# Patient Record
Sex: FEMALE | Race: WHITE | Employment: UNEMPLOYED | URBAN - METROPOLITAN AREA
[De-identification: names, ages, dates, MRNs, and addresses within clinical notes are randomized per-mention and may not be internally consistent; named-entity substitution may affect disease eponyms.]

---

## 2024-08-29 ENCOUNTER — HOSPITAL ENCOUNTER (INPATIENT)
Age: 79
LOS: 4 days | Discharge: NURSING, REHAB OR LONG-TERM CARE | DRG: 641 | End: 2024-09-02
Attending: STUDENT IN AN ORGANIZED HEALTH CARE EDUCATION/TRAINING PROGRAM | Admitting: INTERNAL MEDICINE
Payer: MEDICARE

## 2024-08-29 DIAGNOSIS — L89.90 PRESSURE ULCERS OF SKIN OF MULTIPLE TOPOGRAPHIC SITES: ICD-10-CM

## 2024-08-29 DIAGNOSIS — N39.0 UTI (URINARY TRACT INFECTION), BACTERIAL: ICD-10-CM

## 2024-08-29 DIAGNOSIS — E87.1 HYPONATREMIA: Primary | ICD-10-CM

## 2024-08-29 DIAGNOSIS — R62.7 FTT (FAILURE TO THRIVE) IN ADULT: ICD-10-CM

## 2024-08-29 DIAGNOSIS — A49.9 UTI (URINARY TRACT INFECTION), BACTERIAL: ICD-10-CM

## 2024-08-29 LAB
ALBUMIN SERPL-MCNC: 3.6 G/DL (ref 3.5–5.2)
ALP SERPL-CCNC: 87 U/L (ref 35–104)
ALT SERPL-CCNC: 15 U/L (ref 0–32)
ANION GAP SERPL CALCULATED.3IONS-SCNC: 11 MMOL/L (ref 7–16)
AST SERPL-CCNC: 34 U/L (ref 0–31)
BACTERIA URNS QL MICRO: ABNORMAL
BASOPHILS # BLD: 0.03 K/UL (ref 0–0.2)
BASOPHILS NFR BLD: 0 % (ref 0–2)
BILIRUB SERPL-MCNC: 0.5 MG/DL (ref 0–1.2)
BILIRUB UR QL STRIP: NEGATIVE
BUN SERPL-MCNC: 7 MG/DL (ref 6–23)
CALCIUM SERPL-MCNC: 8.6 MG/DL (ref 8.6–10.2)
CHLORIDE SERPL-SCNC: 82 MMOL/L (ref 98–107)
CLARITY UR: ABNORMAL
CO2 SERPL-SCNC: 30 MMOL/L (ref 22–29)
COLOR UR: YELLOW
CORTIS SERPL-MCNC: 21.7 UG/DL (ref 2.7–18.4)
CORTISOL COLLECTION INFO: ABNORMAL
CREAT SERPL-MCNC: 0.3 MG/DL (ref 0.5–1)
CREAT UR-MCNC: 31.1 MG/DL (ref 29–226)
CREAT UR-MCNC: 31.3 MG/DL (ref 29–226)
CRP SERPL HS-MCNC: 5 MG/L (ref 0–5)
EOSINOPHIL # BLD: 0.14 K/UL (ref 0.05–0.5)
EOSINOPHILS RELATIVE PERCENT: 2 % (ref 0–6)
EPI CELLS #/AREA URNS HPF: ABNORMAL /HPF
ERYTHROCYTE [DISTWIDTH] IN BLOOD BY AUTOMATED COUNT: 14.1 % (ref 11.5–15)
ERYTHROCYTE [SEDIMENTATION RATE] IN BLOOD BY WESTERGREN METHOD: 20 MM/HR (ref 0–20)
GFR, ESTIMATED: >90 ML/MIN/1.73M2
GLUCOSE SERPL-MCNC: 82 MG/DL (ref 74–99)
GLUCOSE UR STRIP-MCNC: NEGATIVE MG/DL
HCT VFR BLD AUTO: 40.1 % (ref 34–48)
HGB BLD-MCNC: 14 G/DL (ref 11.5–15.5)
HGB UR QL STRIP.AUTO: NEGATIVE
IMM GRANULOCYTES # BLD AUTO: 0.09 K/UL (ref 0–0.58)
IMM GRANULOCYTES NFR BLD: 1 % (ref 0–5)
KETONES UR STRIP-MCNC: NEGATIVE MG/DL
LACTATE BLDV-SCNC: 0.9 MMOL/L (ref 0.5–2.2)
LEUKOCYTE ESTERASE UR QL STRIP: ABNORMAL
LYMPHOCYTES NFR BLD: 0.5 K/UL (ref 1.5–4)
LYMPHOCYTES RELATIVE PERCENT: 6 % (ref 20–42)
MAGNESIUM SERPL-MCNC: 1.9 MG/DL (ref 1.6–2.6)
MCH RBC QN AUTO: 29.5 PG (ref 26–35)
MCHC RBC AUTO-ENTMCNC: 34.9 G/DL (ref 32–34.5)
MCV RBC AUTO: 84.4 FL (ref 80–99.9)
MONOCYTES NFR BLD: 0.6 K/UL (ref 0.1–0.95)
MONOCYTES NFR BLD: 7 % (ref 2–12)
NEUTROPHILS NFR BLD: 85 % (ref 43–80)
NEUTS SEG NFR BLD: 7.76 K/UL (ref 1.8–7.3)
NITRITE UR QL STRIP: POSITIVE
OSMOLALITY UR: 404 MOSM/KG (ref 300–900)
PH UR STRIP: 7.5 [PH] (ref 5–9)
PLATELET # BLD AUTO: 401 K/UL (ref 130–450)
PMV BLD AUTO: 8.8 FL (ref 7–12)
POTASSIUM SERPL-SCNC: 4 MMOL/L (ref 3.5–5)
PROT SERPL-MCNC: 7.2 G/DL (ref 6.4–8.3)
PROT UR STRIP-MCNC: NEGATIVE MG/DL
RBC # BLD AUTO: 4.75 M/UL (ref 3.5–5.5)
RBC #/AREA URNS HPF: ABNORMAL /HPF
SODIUM SERPL-SCNC: 123 MMOL/L (ref 132–146)
SODIUM UR-SCNC: 138 MMOL/L
SP GR UR STRIP: 1.01 (ref 1–1.03)
TOTAL PROTEIN, URINE: 12 MG/DL (ref 0–12)
URINE TOTAL PROTEIN CREATININE RATIO: 0.39 (ref 0–0.2)
UROBILINOGEN UR STRIP-ACNC: 2 EU/DL (ref 0–1)
UUN UR-MCNC: 224 MG/DL (ref 800–1666)
WBC #/AREA URNS HPF: ABNORMAL /HPF
WBC OTHER # BLD: 9.1 K/UL (ref 4.5–11.5)

## 2024-08-29 PROCEDURE — 85025 COMPLETE CBC W/AUTO DIFF WBC: CPT

## 2024-08-29 PROCEDURE — 85652 RBC SED RATE AUTOMATED: CPT

## 2024-08-29 PROCEDURE — 87077 CULTURE AEROBIC IDENTIFY: CPT

## 2024-08-29 PROCEDURE — 82570 ASSAY OF URINE CREATININE: CPT

## 2024-08-29 PROCEDURE — 99285 EMERGENCY DEPT VISIT HI MDM: CPT

## 2024-08-29 PROCEDURE — 6370000000 HC RX 637 (ALT 250 FOR IP)

## 2024-08-29 PROCEDURE — 87086 URINE CULTURE/COLONY COUNT: CPT

## 2024-08-29 PROCEDURE — 82533 TOTAL CORTISOL: CPT

## 2024-08-29 PROCEDURE — 83735 ASSAY OF MAGNESIUM: CPT

## 2024-08-29 PROCEDURE — 83605 ASSAY OF LACTIC ACID: CPT

## 2024-08-29 PROCEDURE — 84156 ASSAY OF PROTEIN URINE: CPT

## 2024-08-29 PROCEDURE — 2060000000 HC ICU INTERMEDIATE R&B

## 2024-08-29 PROCEDURE — 2580000003 HC RX 258

## 2024-08-29 PROCEDURE — 86140 C-REACTIVE PROTEIN: CPT

## 2024-08-29 PROCEDURE — 6360000002 HC RX W HCPCS

## 2024-08-29 PROCEDURE — 84300 ASSAY OF URINE SODIUM: CPT

## 2024-08-29 PROCEDURE — 84540 ASSAY OF URINE/UREA-N: CPT

## 2024-08-29 PROCEDURE — 6370000000 HC RX 637 (ALT 250 FOR IP): Performed by: SPECIALIST

## 2024-08-29 PROCEDURE — 83935 ASSAY OF URINE OSMOLALITY: CPT

## 2024-08-29 PROCEDURE — 81001 URINALYSIS AUTO W/SCOPE: CPT

## 2024-08-29 PROCEDURE — 6370000000 HC RX 637 (ALT 250 FOR IP): Performed by: STUDENT IN AN ORGANIZED HEALTH CARE EDUCATION/TRAINING PROGRAM

## 2024-08-29 PROCEDURE — 80053 COMPREHEN METABOLIC PANEL: CPT

## 2024-08-29 RX ORDER — QUETIAPINE FUMARATE 25 MG/1
50 TABLET, FILM COATED ORAL NIGHTLY
Status: DISCONTINUED | OUTPATIENT
Start: 2024-08-29 | End: 2024-09-02 | Stop reason: HOSPADM

## 2024-08-29 RX ORDER — DIPHENHYDRAMINE HCL 25 MG
25 TABLET ORAL EVERY 6 HOURS PRN
Status: DISCONTINUED | OUTPATIENT
Start: 2024-08-29 | End: 2024-09-02 | Stop reason: HOSPADM

## 2024-08-29 RX ORDER — MIRTAZAPINE 15 MG/1
15 TABLET, FILM COATED ORAL NIGHTLY
Status: DISCONTINUED | OUTPATIENT
Start: 2024-08-29 | End: 2024-09-02 | Stop reason: HOSPADM

## 2024-08-29 RX ORDER — QUETIAPINE FUMARATE 50 MG/1
50 TABLET, FILM COATED ORAL NIGHTLY
COMMUNITY

## 2024-08-29 RX ORDER — DOXYCYCLINE 100 MG/1
100 CAPSULE ORAL EVERY 12 HOURS SCHEDULED
Status: DISCONTINUED | OUTPATIENT
Start: 2024-08-29 | End: 2024-09-02

## 2024-08-29 RX ORDER — ENOXAPARIN SODIUM 100 MG/ML
40 INJECTION SUBCUTANEOUS DAILY
Status: DISCONTINUED | OUTPATIENT
Start: 2024-08-29 | End: 2024-09-02 | Stop reason: HOSPADM

## 2024-08-29 RX ORDER — DONEPEZIL HYDROCHLORIDE 5 MG/1
5 TABLET, FILM COATED ORAL NIGHTLY
COMMUNITY

## 2024-08-29 RX ORDER — TRIAMCINOLONE ACETONIDE 1 MG/G
OINTMENT TOPICAL 2 TIMES DAILY
Status: DISCONTINUED | OUTPATIENT
Start: 2024-08-29 | End: 2024-09-02 | Stop reason: HOSPADM

## 2024-08-29 RX ORDER — SODIUM CHLORIDE 9 MG/ML
INJECTION, SOLUTION INTRAVENOUS CONTINUOUS
Status: DISCONTINUED | OUTPATIENT
Start: 2024-08-29 | End: 2024-09-01

## 2024-08-29 RX ORDER — BACITRACIN ZINC 500 [USP'U]/G
OINTMENT TOPICAL ONCE
Status: COMPLETED | OUTPATIENT
Start: 2024-08-29 | End: 2024-08-29

## 2024-08-29 RX ORDER — PANTOPRAZOLE SODIUM 40 MG/1
40 TABLET, DELAYED RELEASE ORAL
Status: DISCONTINUED | OUTPATIENT
Start: 2024-08-30 | End: 2024-09-02 | Stop reason: HOSPADM

## 2024-08-29 RX ORDER — HYDROCHLOROTHIAZIDE 25 MG/1
25 TABLET ORAL DAILY
Status: ON HOLD | COMMUNITY
End: 2024-09-02 | Stop reason: HOSPADM

## 2024-08-29 RX ORDER — DONEPEZIL HYDROCHLORIDE 5 MG/1
5 TABLET, FILM COATED ORAL NIGHTLY
Status: DISCONTINUED | OUTPATIENT
Start: 2024-08-29 | End: 2024-09-02 | Stop reason: HOSPADM

## 2024-08-29 RX ADMIN — QUETIAPINE FUMARATE 50 MG: 25 TABLET ORAL at 22:05

## 2024-08-29 RX ADMIN — MICONAZOLE NITRATE: 2 OINTMENT TOPICAL at 22:05

## 2024-08-29 RX ADMIN — TRIAMCINOLONE ACETONIDE: 1 OINTMENT TOPICAL at 22:05

## 2024-08-29 RX ADMIN — DONEPEZIL HYDROCHLORIDE 5 MG: 5 TABLET, FILM COATED ORAL at 22:05

## 2024-08-29 RX ADMIN — SODIUM CHLORIDE: 9 INJECTION, SOLUTION INTRAVENOUS at 15:15

## 2024-08-29 RX ADMIN — MIRTAZAPINE 15 MG: 15 TABLET, FILM COATED ORAL at 22:05

## 2024-08-29 RX ADMIN — DOXYCYCLINE HYCLATE 100 MG: 100 CAPSULE ORAL at 22:05

## 2024-08-29 RX ADMIN — DIPHENHYDRAMINE HYDROCHLORIDE 25 MG: 25 TABLET ORAL at 18:19

## 2024-08-29 RX ADMIN — ENOXAPARIN SODIUM 40 MG: 100 INJECTION SUBCUTANEOUS at 15:11

## 2024-08-29 RX ADMIN — BACITRACIN ZINC: 500 OINTMENT TOPICAL at 11:58

## 2024-08-29 NOTE — CONSULTS
CONSULTATION NOTE :ID     Patient - Aracelis Murrieta,  Age - 79 y.o.    - 1945      Room Number - 0539/0539-02   MRN -  [id number]   [facility] # - [id number]  Date of Admission -  2024  7:13 AM  Patient's PCP: Michael Brock DO     Requesting Physician: Cesar Mcdowell DO    REASON FOR CONSULTATION   wounds  HISTORY OF PRESENT ILLNESS   This is a very pleasant 79 y.o. female who was admitted on 2024   to the hospital with a chief complaints of   Chief Complaint   Patient presents with    Wound Infection     Pt here for wound check and wound not getting better     ID was consulted on 24 rash wounds  Daughter present   Pt is in bed has no f/c/nv/d/pain  Pt has post back side rash POA    Daughter has been using atbx creams   Pt still smokes somewhat   Pt had [facility]   Pt deferred to daughter with questions   Pt is [place]   Pt lives with daughter  Usually sleeps in a recliner    Current antibiotics none         PAST MEDICAL AND SURGICAL HISTORY     No past medical history on file.    No past surgical history on file.    MEDICATIONS PRIOR TO ADMISSION:     Scheduled Meds:   donepezil  5 mg Oral Nightly    QUEtiapine  50 mg Oral Nightly    [START ON 2024] pantoprazole  40 mg Oral QAM AC    enoxaparin  40 mg SubCUTAneous Daily     Continuous Infusions:   sodium chloride 50 mL/hr at 24 1515     PRN Meds:diphenhydrAMINE    ALLERGIES:    Patient has no known allergies.  SOCIAL HISTORY:   TOBACCO:   reports that she has been smoking cigarettes. She does not have any smokeless tobacco history on file.     ETOH:   has no history on file for alcohol use.  Patient currently lives with family     FAMILY HISTORY:   No family history on file.    REVIEW OF SYSTEMS:     Constitutional: no fever, no night sweats, no fatigue.  Head: no head ache , no head injury, no migranes.  Eye: no blurring of vision, no double vision.  Ears: no hearing difficulty, no tinnitus  Mouth/throat: no  ulceration, dental caries , dysphagia  Lungs: no cough no chest pain  CVS: no palpitation, no chest pain, no shortness of breath  GI: no abdominal pain, no nausea , no vomiting, no constipation  JEANNIE: no dysuria, frequency and urgency, no hematuria, no kidney stones  Musculoskeletal: no joint pain, swelling , stiffness,  Endocrine: no polyuria, polydipsia, no cold or heat intolerance  Hematology: no anemia, no easy brusing or bleeding, no hx of clotting disorder  Dermatology: no skin rash, no eczema, no pruritis,  Psychiatry: no depression, no anxiety,no panic attacks, no suicide ideation    PHYSICAL EXAM:     BP (!) 170/75   Pulse 79   Temp 98.6 °F (37 °C) (Oral)   Resp 18   SpO2 95%   General:    Comfortable    HEENT:   AT/NC  ancietric moist oral mucosa.  Cardiovascular:   RRR ,S1S2,   Lungs:   CTAB  ant   Abdomen:    Soft, non tender to palpation.  :   ellis  Extremities:    No edema FROM  Skin:     Warm and dry. No rash               CNS:    Awake and alert  NAD  Lines:       Peripheral Intravenous Line:  Peripheral IV 08/29/24 Right Antecubital (Active)   Site Assessment Clean, dry & intact 08/29/24 1301   Line Status Capped;Normal saline locked 08/29/24 1301   Line Care Connections checked and tightened 08/29/24 1301   Phlebitis Assessment No symptoms 08/29/24 1301   Infiltration Assessment 0 08/29/24 1301   Alcohol Cap Used Yes 08/29/24 1301   Dressing Status Clean, dry & intact 08/29/24 1301   Dressing Type Transparent 08/29/24 1301          Drain(s):  External Urinary Catheter (Active)   Site Assessment Clean,dry & intact 08/29/24 1310   Placement Initiated 08/29/24 1310   Catheter Care Catheter/Wick replaced;Suction Canister/Tubing changed 08/29/24 1310   Perineal Care Yes 08/29/24 1310   Suction 40 mmgHg continuous 08/29/24 1310         LABS:     CBC:   Recent Labs     08/29/24  0808   WBC 9.1   HGB 14.0        BMP:    Recent Labs     08/29/24  0808   *   K 4.0   CL 82*   CO2 30*   BUN

## 2024-08-29 NOTE — FLOWSHEET NOTE
Inpatient Wound Care    Admit Date: 8/29/2024  7:13 AM    Reason for consult:  entire back spine area   Buttocks coccyx sacral area      Significant history:  No past medical history on file.    Wound history:      Findings:     08/29/24 1654   Wound 08/29/24 Back Medial   Date First Assessed: 08/29/24   Present on Original Admission: Yes  Primary Wound Type: Pressure Injury  Location: Back  Wound Location Orientation: Medial   Wound Image      Wound Etiology   (deep tissue areas over spine very bony)   Wound Cleansed Cleansed with saline   Wound Assessment Purple/maroon   Drainage Amount Small (< 25%)   Drainage Description Serosanguinous   Odor None   Wound 08/29/24 Coccyx Medial   Date First Assessed: 08/29/24   Present on Original Admission: Yes  Primary Wound Type: Pressure Injury  Location: (c) Coccyx  Wound Location Orientation: Medial   Wound Image    Wound Cleansed Cleansed with saline   Wound Assessment Purple/maroon   Drainage Amount Small (< 25%)   Odor None       **Informed Consent**    The patient has given verbal consent to have photos taken of back buttocks coccyx all skine and inserted into their chart as part of their permanent medical record for purposes of documentation, treatment management and/or medical review.   All Images taken on 8/29/24 of patient name: Aracelis Murrieta were transmitted and stored on secured Epic  Site located within Media Folder Tab by a registered Epic-Haiku Mobile Application Device.       Impression:  deep tissue injury to spine  Coccyx area    This is an extensive redness ? From incontinence  Pt is scratching at skin  Pt was pulling any dressing off    Interventions in place:  antibiotic ointment ordered    Plan:  Low air loss bed  Cont with bacitracin  Recommend a ellis   Recommend Dr May consult  derm consult              Purvi Alfonso RN 8/29/2024 4:57 PM

## 2024-08-29 NOTE — CARE COORDINATION
Case Management Assessment  Initial Evaluation    Date/Time of Evaluation: 8/29/2024 4:30 PM  Assessment Completed by: NICOLA Cam    If patient is discharged prior to next notation, then this note serves as note for discharge by case management.    Patient Name: Aracelis Murrieta                   YOB: 1945  Diagnosis: Hyponatremia [E87.1]  FTT (failure to thrive) in adult [R62.7]  Pressure ulcers of skin of multiple topographic sites [L89.90]                   Date / Time: 8/29/2024  7:13 AM    Patient Admission Status: Inpatient   Readmission Risk (Low < 19, Mod (19-27), High > 27): Readmission Risk Score: 11.3    Current PCP: Michael Brock, DO  PCP verified by CM? Yes    Chart Reviewed: Yes      History Provided by: Child/Family, Medical Record  Patient Orientation: Alert and Oriented, Other (see comment) (Has some confusion)    Patient Cognition: Alert    Hospitalization in the last 30 days (Readmission):  No    If yes, Readmission Assessment in CM Navigator will be completed.    Advance Directives:      Code Status: Not on file   Patient's Primary Decision Maker is: Named in Scanned ACP Document    Primary Decision Maker: Jennifer NolascoJuliKRYSTAL - Child - [phone]    Discharge Planning:    Patient lives with: Children Type of Home: House  Primary Care Giver: Family  Patient Support Systems include: Children   Current Financial resources:    Current community resources:    Current services prior to admission: Other (Comment) (WOUND CARE)            Current DME:              Type of Home Care services:  None    ADLS  Prior functional level: Mobility, Shopping, Housework, Cooking, Toileting  Current functional level: Mobility, Shopping, Housework, Cooking, Toileting    PT AM-PAC:   /24  OT AM-PAC:   /24    Family can provide assistance at DC: Other (comment) (Not in current state)  Would you like Case Management to discuss the discharge plan with any other family members/significant

## 2024-08-29 NOTE — ACP (ADVANCE CARE PLANNING)
Advance Care Planning   Healthcare Decision Maker:    Primary Decision Maker: Nick Nolasco - Child - [phone]    Click here to complete Healthcare Decision Makers including selection of the Healthcare Decision Maker Relationship (ie \"Primary\").  Today we documented Decision Maker(s) consistent with ACP documents on file.   Electronically signed by NICOLA Cam on 8/29/2024 at 11:47 AM

## 2024-08-29 NOTE — ED NOTES
Received pt report from Ravi CHEUNG and assuming care of pt now. Urine still needed from pt, Ravi previously set of external catheter on pt.

## 2024-08-29 NOTE — ED PROVIDER NOTES
[facility]  EMERGENCY DEPARTMENT ENCOUNTER        Pt Name: Aracelis Murrieta  MRN: [id number]  Birthdate 1945  Date of evaluation: 8/29/2024  Provider: Radha Vee DO  PCP: Michael Brock DO  Note Started: 9:22 AM EDT 8/29/24    CHIEF COMPLAINT       Chief Complaint   Patient presents with    Wound Infection     Pt here for wound check and wound not getting better       HISTORY OF PRESENT ILLNESS: 1 or more Elements     Limitations to history : None    Aracelis Murrieta is a 79 y.o. female who presents urgency department for evaluation of wound check.  Daughter is with her at bedside.  States the patient has significant pressure wounds on her back and sacral region; they have been present for several months, initially she was getting outpatient wound care and they seems to be getting better but over the past week or so they are starting to get worse.  She does not have home health care, her daughter cares for the wounds by herself.  She says the patient is pretty immobile does not move around very much and often just lays or sits, so her pressure ulcers continue to get worse.  No fevers or chills.  No other complaints.  No falls or injuries.  Daughter states they saw her PCP yesterday and they recommended that the patient be admitted.  Daughter is concerned because the patient is not moving around and her wounds continue to get worse due to immobility.  She feels that the patient might require inpatient rehab/physical therapy and close her wound care at her nursing facility.  Patient reports pain to her wounds, no other complaints.  No chest pain palpitations or shortness of breath.  No abdominal pain nausea vomiting diarrhea.    Daughter does state that the patient's urine has seemed to have a foul odor.  Patient denies any abnormal urinary symptoms.    Of note, daughter states up until a few months ago that the patient had been living by herself after her  passed away and recently moved  daughter discussed today’s results, in addition to providing specific details for the plan of care and counseling regarding the diagnosis and prognosis.  Questions are answered at this time and they are agreeable with the plan.    CONSULTS:   IP CONSULT TO SOCIAL WORK  IP CONSULT TO INFECTIOUS DISEASES       Please see ED course for any additional MDM documentation.       CRITICAL CARE TIME (.cct)     0 minutes            I am the Primary Clinician of Record.    FINAL IMPRESSION      1. Hyponatremia    2. Pressure ulcers of skin of multiple topographic sites    3. FTT (failure to thrive) in adult    4. UTI (urinary tract infection), bacterial          DISPOSITION/PLAN     DISPOSITION Admitted 08/29/2024 11:23:58 AM    Disposition: Admit to telemetry  Patient condition is stable      PATIENT REFERRED TO:  No follow-up provider specified.    DISCHARGE MEDICATIONS:  Current Discharge Medication List          DISCONTINUED MEDICATIONS:  Current Discharge Medication List                      Radha Vee D.O.     Emergency Medicine      8/29/2024 9:46 PM      NOTE: This report was transcribed using voice recognition software. Every effort was made to ensure accuracy; however, inadvertent computerized transcription errors may be present            Radha Vee DO  08/29/24 2146

## 2024-08-29 NOTE — PROGRESS NOTES
4 Eyes Skin Assessment     NAME:  Aracelis Murrieta  YOB: 1945  MEDICAL RECORD NUMBER:  [id number]    The patient is being assessed for  Admission    I agree that at least one RN has performed a thorough Head to Toe Skin Assessment on the patient. ALL assessment sites listed below have been assessed.      Areas assessed by both nurses:    Head, Face, Ears, Shoulders, Back, Chest, Arms, Elbows, Hands, Sacrum. Buttock, Coccyx, Ischium, Legs. Feet and Heels, and Under Medical Devices         Does the Patient have a Wound? Yes wound(s) were present on assessment. LDA wound assessment was Initiated and completed by RN    Mid spine purple, mid to lower back red and peeling, buttocks multiple areas of cracked skin  red and peeling skin, excoriation to right and left inner groin       Ruddy Prevention initiated by RN: Yes  Wound Care Orders initiated by RN: Yes    Pressure Injury (Stage 3,4, Unstageable, DTI, NWPT, and Complex wounds) if present, place Wound referral order by RN under : Yes    New Ostomies, if present place, Ostomy referral order under : No     Nurse 1 eSignature: Electronically signed by Ashly Varghese RN on 8/29/24 at 4:55 PM EDT    **SHARE this note so that the co-signing nurse can place an eSignature**    Nurse 2 eSignature: {Esignature:[id number]}

## 2024-08-30 LAB
25(OH)D3 SERPL-MCNC: 10.2 NG/ML (ref 30–100)
25(OH)D3 SERPL-MCNC: 9.9 NG/ML (ref 30–100)
ALBUMIN SERPL-MCNC: 3.2 G/DL (ref 3.5–5.2)
ALBUMIN SERPL-MCNC: 3.3 G/DL (ref 3.5–5.2)
ALP SERPL-CCNC: 71 U/L (ref 35–104)
ALP SERPL-CCNC: 72 U/L (ref 35–104)
ALT SERPL-CCNC: 10 U/L (ref 0–32)
ALT SERPL-CCNC: 9 U/L (ref 0–32)
ANION GAP SERPL CALCULATED.3IONS-SCNC: 13 MMOL/L (ref 7–16)
ANION GAP SERPL CALCULATED.3IONS-SCNC: 8 MMOL/L (ref 7–16)
ASO AB SERPL-ACNC: 31 IU/ML (ref 0–200)
ASO AB SERPL-ACNC: 40 IU/ML (ref 0–200)
AST SERPL-CCNC: 14 U/L (ref 0–31)
AST SERPL-CCNC: 19 U/L (ref 0–31)
BASOPHILS # BLD: 0 K/UL (ref 0–0.2)
BASOPHILS NFR BLD: 0 % (ref 0–2)
BILIRUB SERPL-MCNC: 0.4 MG/DL (ref 0–1.2)
BILIRUB SERPL-MCNC: 0.4 MG/DL (ref 0–1.2)
BUN SERPL-MCNC: 7 MG/DL (ref 6–23)
BUN SERPL-MCNC: 7 MG/DL (ref 6–23)
CALCIUM SERPL-MCNC: 8.2 MG/DL (ref 8.6–10.2)
CALCIUM SERPL-MCNC: 8.7 MG/DL (ref 8.6–10.2)
CHLORIDE SERPL-SCNC: 87 MMOL/L (ref 98–107)
CHLORIDE SERPL-SCNC: 88 MMOL/L (ref 98–107)
CHOLEST SERPL-MCNC: 155 MG/DL
CHOLEST SERPL-MCNC: 159 MG/DL
CO2 SERPL-SCNC: 28 MMOL/L (ref 22–29)
CO2 SERPL-SCNC: 29 MMOL/L (ref 22–29)
CREAT SERPL-MCNC: 0.3 MG/DL (ref 0.5–1)
CREAT SERPL-MCNC: 0.4 MG/DL (ref 0.5–1)
EOSINOPHIL # BLD: 0.08 K/UL (ref 0.05–0.5)
EOSINOPHILS RELATIVE PERCENT: 1 % (ref 0–6)
ERYTHROCYTE [DISTWIDTH] IN BLOOD BY AUTOMATED COUNT: 13.9 % (ref 11.5–15)
FOLATE SERPL-MCNC: 10.7 NG/ML (ref 4.8–24.2)
FOLATE SERPL-MCNC: 7.8 NG/ML (ref 4.8–24.2)
GFR, ESTIMATED: >90 ML/MIN/1.73M2
GFR, ESTIMATED: >90 ML/MIN/1.73M2
GLUCOSE SERPL-MCNC: 110 MG/DL (ref 74–99)
GLUCOSE SERPL-MCNC: 111 MG/DL (ref 74–99)
HCT VFR BLD AUTO: 36.6 % (ref 34–48)
HDLC SERPL-MCNC: 60 MG/DL
HDLC SERPL-MCNC: 63 MG/DL
HGB BLD-MCNC: 12.8 G/DL (ref 11.5–15.5)
IRON SATN MFR SERPL: 22 % (ref 15–50)
IRON SATN MFR SERPL: 22 % (ref 15–50)
IRON SERPL-MCNC: 54 UG/DL (ref 37–145)
IRON SERPL-MCNC: 55 UG/DL (ref 37–145)
LDLC SERPL CALC-MCNC: 81 MG/DL
LDLC SERPL CALC-MCNC: 89 MG/DL
LYMPHOCYTES NFR BLD: 0.99 K/UL (ref 1.5–4)
LYMPHOCYTES RELATIVE PERCENT: 11 % (ref 20–42)
MAGNESIUM SERPL-MCNC: 1.8 MG/DL (ref 1.6–2.6)
MAGNESIUM SERPL-MCNC: 1.9 MG/DL (ref 1.6–2.6)
MCH RBC QN AUTO: 29.7 PG (ref 26–35)
MCHC RBC AUTO-ENTMCNC: 35 G/DL (ref 32–34.5)
MCV RBC AUTO: 84.9 FL (ref 80–99.9)
MONOCYTES NFR BLD: 0.15 K/UL (ref 0.1–0.95)
MONOCYTES NFR BLD: 2 % (ref 2–12)
NEUTROPHILS NFR BLD: 86 % (ref 43–80)
NEUTS SEG NFR BLD: 7.58 K/UL (ref 1.8–7.3)
OSMOLALITY SERPL: 264 MOSM/KG (ref 285–310)
PLATELET # BLD AUTO: 390 K/UL (ref 130–450)
PMV BLD AUTO: 8.6 FL (ref 7–12)
POTASSIUM SERPL-SCNC: 3.1 MMOL/L (ref 3.5–5)
POTASSIUM SERPL-SCNC: 3.2 MMOL/L (ref 3.5–5)
PROCALCITONIN SERPL-MCNC: <0.02 NG/ML (ref 0–0.08)
PROT SERPL-MCNC: 5.5 G/DL (ref 6.4–8.3)
PROT SERPL-MCNC: 5.9 G/DL (ref 6.4–8.3)
RBC # BLD AUTO: 4.31 M/UL (ref 3.5–5.5)
RBC # BLD: ABNORMAL 10*6/UL
SODIUM SERPL-SCNC: 124 MMOL/L (ref 132–146)
SODIUM SERPL-SCNC: 129 MMOL/L (ref 132–146)
T4 FREE SERPL-MCNC: 1.1 NG/DL (ref 0.9–1.7)
T4 FREE SERPL-MCNC: 1.2 NG/DL (ref 0.9–1.7)
TIBC SERPL-MCNC: 244 UG/DL (ref 250–450)
TIBC SERPL-MCNC: 247 UG/DL (ref 250–450)
TRIGL SERPL-MCNC: 52 MG/DL
TRIGL SERPL-MCNC: 55 MG/DL
TROPONIN I SERPL HS-MCNC: 11 NG/L (ref 0–9)
TSH SERPL DL<=0.05 MIU/L-ACNC: 2.56 UIU/ML (ref 0.27–4.2)
VIT B12 SERPL-MCNC: 284 PG/ML (ref 211–946)
VIT B12 SERPL-MCNC: 296 PG/ML (ref 211–946)
VLDLC SERPL CALC-MCNC: 10 MG/DL
VLDLC SERPL CALC-MCNC: 11 MG/DL
WBC OTHER # BLD: 8.8 K/UL (ref 4.5–11.5)

## 2024-08-30 PROCEDURE — 6370000000 HC RX 637 (ALT 250 FOR IP): Performed by: SPECIALIST

## 2024-08-30 PROCEDURE — 86063 ANTISTREPTOLYSIN O SCREEN: CPT

## 2024-08-30 PROCEDURE — 2580000003 HC RX 258

## 2024-08-30 PROCEDURE — 6370000000 HC RX 637 (ALT 250 FOR IP)

## 2024-08-30 PROCEDURE — 82306 VITAMIN D 25 HYDROXY: CPT

## 2024-08-30 PROCEDURE — 83550 IRON BINDING TEST: CPT

## 2024-08-30 PROCEDURE — 2060000000 HC ICU INTERMEDIATE R&B

## 2024-08-30 PROCEDURE — 82746 ASSAY OF FOLIC ACID SERUM: CPT

## 2024-08-30 PROCEDURE — 84145 PROCALCITONIN (PCT): CPT

## 2024-08-30 PROCEDURE — 83540 ASSAY OF IRON: CPT

## 2024-08-30 PROCEDURE — 6360000002 HC RX W HCPCS

## 2024-08-30 PROCEDURE — 80053 COMPREHEN METABOLIC PANEL: CPT

## 2024-08-30 PROCEDURE — 84484 ASSAY OF TROPONIN QUANT: CPT

## 2024-08-30 PROCEDURE — 83735 ASSAY OF MAGNESIUM: CPT

## 2024-08-30 PROCEDURE — 84443 ASSAY THYROID STIM HORMONE: CPT

## 2024-08-30 PROCEDURE — 97530 THERAPEUTIC ACTIVITIES: CPT | Performed by: OCCUPATIONAL THERAPIST

## 2024-08-30 PROCEDURE — 83930 ASSAY OF BLOOD OSMOLALITY: CPT

## 2024-08-30 PROCEDURE — 84439 ASSAY OF FREE THYROXINE: CPT

## 2024-08-30 PROCEDURE — 93005 ELECTROCARDIOGRAM TRACING: CPT

## 2024-08-30 PROCEDURE — 97165 OT EVAL LOW COMPLEX 30 MIN: CPT | Performed by: OCCUPATIONAL THERAPIST

## 2024-08-30 PROCEDURE — 85025 COMPLETE CBC W/AUTO DIFF WBC: CPT

## 2024-08-30 PROCEDURE — 82607 VITAMIN B-12: CPT

## 2024-08-30 PROCEDURE — 80061 LIPID PANEL: CPT

## 2024-08-30 RX ORDER — POTASSIUM CHLORIDE 1500 MG/1
40 TABLET, EXTENDED RELEASE ORAL ONCE
Status: COMPLETED | OUTPATIENT
Start: 2024-08-30 | End: 2024-08-30

## 2024-08-30 RX ADMIN — MICONAZOLE NITRATE: 2 OINTMENT TOPICAL at 08:25

## 2024-08-30 RX ADMIN — PANTOPRAZOLE SODIUM 40 MG: 40 TABLET, DELAYED RELEASE ORAL at 08:24

## 2024-08-30 RX ADMIN — QUETIAPINE FUMARATE 50 MG: 25 TABLET ORAL at 21:11

## 2024-08-30 RX ADMIN — ENOXAPARIN SODIUM 40 MG: 100 INJECTION SUBCUTANEOUS at 08:25

## 2024-08-30 RX ADMIN — DOXYCYCLINE HYCLATE 100 MG: 100 CAPSULE ORAL at 08:24

## 2024-08-30 RX ADMIN — DOXYCYCLINE HYCLATE 100 MG: 100 CAPSULE ORAL at 21:12

## 2024-08-30 RX ADMIN — SODIUM CHLORIDE: 9 INJECTION, SOLUTION INTRAVENOUS at 10:47

## 2024-08-30 RX ADMIN — POTASSIUM CHLORIDE 40 MEQ: 1500 TABLET, EXTENDED RELEASE ORAL at 15:10

## 2024-08-30 RX ADMIN — DONEPEZIL HYDROCHLORIDE 5 MG: 5 TABLET, FILM COATED ORAL at 21:11

## 2024-08-30 RX ADMIN — TRIAMCINOLONE ACETONIDE: 1 OINTMENT TOPICAL at 08:25

## 2024-08-30 RX ADMIN — MICONAZOLE NITRATE: 2 OINTMENT TOPICAL at 21:12

## 2024-08-30 RX ADMIN — MIRTAZAPINE 15 MG: 15 TABLET, FILM COATED ORAL at 21:11

## 2024-08-30 RX ADMIN — TRIAMCINOLONE ACETONIDE: 1 OINTMENT TOPICAL at 21:12

## 2024-08-30 NOTE — PROGRESS NOTES
Internal Medicine Consult Note    [facility]    Cesar Mcdowell D.O., BESSIEI.                    Caleb Asher D.O., BHARAT Joya D.O.     Tiki Garcias, MSN, APRN, NP-C  Fortunato Rondon, MSN, APRN-CNP  Jairo Aaron, MSN, APRN-CNP  Lauren Pearson, MSN APRN-CNP  Niharika Arenas, MSN. APRN-NP-C     Primary Care Physician: Michael Brock DO   Admitting Physician:  Cesar Mcdowell DO  Admission date and time: 8/29/2024  7:13 AM    Room:  [address] [place] [address]  Admitting diagnosis: Hyponatremia [E87.1]  FTT (failure to thrive) in adult [R62.7]  Pressure ulcers of skin of multiple topographic sites [L89.90]    Patient Name: Aracelis Murrieta  MRN: [id number]    Date of Service: 8/30/2024     Subjective:  Aracelis is a 79 y.o. female who was seen and examined today,8/30/2024, at the bedside.  Patient is resting comfortably and appears more alert today.  There is no family present at the moment.  The patient states that she is trying to eat more and she is drinking her Ensure shakes.  Her sodium is up to 124 today.    Now family present during my examination.    Review of System:   Constitutional:   Denies fever or chills, weight loss or gain, improving fatigue  HEENT:   Denies ear pain, sore throat, sinus or eye problems.  Reports hard of hearing  Cardiovascular:   Denies any chest pain, irregular heartbeats, or palpitations.   Respiratory:   Denies shortness of breath, coughing, sputum production, hemoptysis, or wheezing.  Gastrointestinal:   Denies nausea, vomiting, diarrhea, or constipation.  Denies any abdominal pain.  Genitourinary:    Denies any urgency, frequency, hematuria. Voiding  without difficulty.  Extremities:   Denies lower extremity swelling, edema or cyanosis.   Neurology:    Denies any headache or focal neurological deficits, Denies generalized weakness or memory difficulty.   Psch:   Denies being anxious or depressed.  Musculoskeletal:    Denies   myalgias, joint complaints or back pain.  Generalized weakness  Integumentary:   Reports ulcerations on lower back and buttocks  Hematologic/Lymphatic:  Denies bruising or bleeding.    Physical Exam:  I/O this shift:  In: -   Out: 500 [Urine:500]    Intake/Output Summary (Last 24 hours) at 8/30/2024 1658  Last data filed at 8/30/2024 1145  Gross per 24 hour   Intake --   Output 950 ml   Net -950 ml   I/O last 3 completed shifts:  In: -   Out: 450 [Urine:450]  No data found.  Vital Signs:   Blood pressure (!) 149/65, pulse 91, temperature 99.3 °F (37.4 °C), temperature source Oral, resp. rate 18, SpO2 93%.    General appearance:  Alert, responsive, oriented to person, place, and time.  Appears malnourished no distress.  Head:  Normocephalic. No masses, lesions or tenderness.  Eyes:  PERRLA.  EOMI.  Sclera clear.  Buccal mucosa moist.  ENT:  Ears normal. Mucosa normal.  Neck:    Supple. Trachea midline. No thyromegaly. No JVD. No bruits.  Heart:    Rhythm regular. Rate controlled.  No murmurs.  Lungs:    Symmetrical. Clear to auscultation bilaterally.  No wheezes. No rhonchi. No rales.  Abdomen:   Soft. Non-tender. Non-distended. Bowel sounds positive. No organomegaly or masses.  No pain on palpation.  Extremities:    Peripheral pulses present.  No peripheral edema.  No ulcers. No cyanosis. No clubbing.  Neurologic:    Alert x 3.  No focal deficit.  Cranial nerves grossly intact. No focal weakness.  Psych:   Behavior is normal.  Somewhat depressed since 's suicide on November 14. Speech is not rapid and/or pressured.  Musculoskeletal:   Spine ROM normal. Muscular strength intact. Gait not assessed.  Generalized weakness  Integumentary:  See wound pictures  Genitalia/Breast:  Deferred    Medication:  Scheduled Meds:   donepezil  5 mg Oral Nightly    QUEtiapine  50 mg Oral Nightly    pantoprazole  40 mg Oral QAM AC    enoxaparin  40 mg SubCUTAneous Daily    miconazole nitrate   Topical BID    triamcinolone

## 2024-08-30 NOTE — DISCHARGE INSTR - COC
Drainage Amount Small (< 25%) 08/29/24 1654   Drainage Description Serosanguinous 08/29/24 1654   Odor None 08/29/24 1654   Number of days: 1       Wound 08/29/24 Coccyx Medial (Active)   Wound Image   08/29/24 1654   Wound Etiology Other 08/29/24 1310   Wound Cleansed Cleansed with saline 08/29/24 1654   Wound Assessment Purple/maroon 08/29/24 1654   Drainage Amount Small (< 25%) 08/29/24 1654   Odor None 08/29/24 1654   Number of days: 1        Elimination:  Continence:   Bowel: No  Bladder: No  Urinary Catheter: Insertion Date: 8/29/2024    Colostomy/Ileostomy/Ileal Conduit: No       Date of Last BM: 9/1/2024    Intake/Output Summary (Last 24 hours) at 8/30/2024 1429  Last data filed at 8/30/2024 1145  Gross per 24 hour   Intake --   Output 950 ml   Net -950 ml     I/O last 3 completed shifts:  In: -   Out: 450 [Urine:450]    Safety Concerns:     History of Falls (last 30 days)    Impairments/Disabilities:      None    Nutrition Therapy:  Current Nutrition Therapy:   - Oral Diet:  General    Routes of Feeding: Oral  Liquids: No Restrictions  Daily Fluid Restriction: yes - amount 1800  Last Modified Barium Swallow with Video (Video Swallowing Test): not done    Treatments at the Time of Hospital Discharge:       Rehab Therapies: PT/OT  Weight Bearing Status/Restrictions: No weight bearing restrictions    Patient's personal belongings (please select all that are sent with patient):  Cell Phone    RN SIGNATURE:  Electronically signed by Anibal Watts RN on 9/2/24 at 1:04 PM EDT    CASE MANAGEMENT/SOCIAL WORK SECTION    Inpatient Status Date: 8/29/2024    Readmission Risk Assessment Score:  Readmission Risk              Risk of Unplanned Readmission:  12           Discharging to Facility/ Agency   Name: Critical access hospital  Address:  Phone:  [phone] fax      Dialysis Facility (if applicable)   Name:  Address:  Dialysis Schedule:  Phone:  Fax:    / signature: Electronically  signed by Cheryl Kendrick RN on 8/30/24 at 2:30 PM EDT    PHYSICIAN SECTION    Prognosis: Good    Condition at Discharge: Stable    Rehab Potential (if transferring to Rehab): Fair    Recommended Labs or Other Treatments After Discharge: ***    Physician Certification: I certify the above information and transfer of Aracelis Murrieta  is necessary for the continuing treatment of the diagnosis listed and that she requires Skilled Nursing Facility for less 30 days.     Update Admission H&P: {CHP DME Changes in HandP:[id number]}    PHYSICIAN SIGNATURE:  {Esignature:[id number]}

## 2024-08-30 NOTE — PROGRESS NOTES
OCCUPATIONAL THERAPY INITIAL EVALUATION    [facility]  [address] [facility]         Date:2024                                                   Patient Name: Aracelis Murrieta     MRN: [id number]     : 1945     Room: [address] [place] [address]       Evaluating OT: Kate Carter, OTR/L; [id number]       Referring Provider and Orders/Date:    OT eval and treat  Start:  24,   End:  24,   ONE TIME,   Standing Count:  1 Occurrences,   R       July, Cesar CUELLAR DO    Recommended placement: subacute rehab       Diagnosis:   1. Hyponatremia    2. Pressure ulcers of skin of multiple topographic sites    3. FTT (failure to thrive) in adult    4. UTI (urinary tract infection), bacterial         Surgery: noen      Pertinent Medical History:      No past medical history on file.     No past surgical history on file.    Precautions:  Fall Risk, wound on the posterior spine and buttock, ellis        Assessment of current deficits     [x] Functional mobility  [x]ADLs  [x] Strength               []Cognition     [x] Functional transfers   [x] IADLs         [x] Safety Awareness   [x]Endurance     [] Fine Coordination              [x] Balance      [] Vision/perception   []Sensation      [x]Gross Motor Coordination  [] ROM  [] Delirium                   [] Motor Control     OT PLAN OF CARE   OT POC based on physician orders, patient diagnosis and results of clinical assessment    Frequency/Duration 1-3 days/wk for 2 weeks PRN   Specific OT Treatment Interventions to include:   * Instruction/training on adapted ADL techniques and AE recommendations to increase functional independence within precautions       * Training on energy conservation strategies, correct breathing pattern and techniques to improve independence/tolerance for self-care routine  * Functional transfer/mobility training/DME recommendations for increased independence, safety, and fall

## 2024-08-30 NOTE — H&P
[address] [address]                           HISTORY & PHYSICAL      PATIENT NAME: XAVIER MCKEON              : 1945  MED REC NO: [id number]                        ROOM: 0539  ACCOUNT NO: [id number]                       ADMIT DATE: 2024  PROVIDER: Cesar Mcdowell DO      CHIEF COMPLAINT AND HISTORY OF CHIEF COMPLAINT:  This is a pleasant 79-year-old white female who was admitted to [facility].  The patient was admitted to the hospital here through the emergency room on 2024.  The patient was admitted to the hospital here with increasing amount of discomfort.  Apparently, the patient had been somewhat sedentary at home due to inability and associated refusal to walk.  The patient presented to hospital with a large wound noted on the posterior spine area and also on the buttock area.  The patient has been getting outpatient wound care, but because of progressive decline, was seen and evaluated in the emergency room.  It was also noted at this time that her sodium was low at 123.  The patient does have evidence of poor nutrition along with polydipsia.  The patient has been admitted to the hospital to immediate care unit at this time.  The patient was seen and interviewed in the presence of her daughter.  Apparently, the patient was in relative good health up until 2023.  At that time, her  apparently shot himself and she discovered him.  Since that period of time, the patient has been showing a decline in condition, manifested by overall decline in her general health, refusal to ambulate and eat, along with refusing to seek psychological counseling.  Because of the decline noted, her daughter has presented her to live with her, who had been the primary caregiver.  The patient was seen and interviewed with her daughter.  From a cardiac standpoint of view, the patient currently

## 2024-08-30 NOTE — CARE COORDINATION
CM note: Met with patient and daughter in follow up to transition of care planning initiated by ER SANDEEP.  Pt has been accepted at [facility].  Pt needs a 3 day qualfiying inpatient stay under Medicare guidelines in order to discharge to SNF, this will not be completed until 9/1/24.  HENS completed in anticipation of weekend discharge.  JAMES will need signed by physician.

## 2024-08-30 NOTE — PROGRESS NOTES
Patient - Aracelis Murrieta,  Age - 79 y.o.    - 1945      Room Number - 0539/0539-02   MRN -  [id number]   [facility] # - [id number]  Date of Admission -  2024  7:13 AM    Problem list of patient:     Hospital Problems             Last Modified POA    * (Principal) Hyponatremia 2024 Yes     ASSESSMENT/PLAN       Cellulitis post back   Dermatitis post back   Uti   Suggest consulting wound care/ostomy nurse  Continue wound care  Encourage nutritional support  Continue to off load wound/pressure relief bed  Try triamcinolone/miconazole applied to back  doxycycline   Aso mrsa screen   Urine cx       SUBJECTIVE:   DOS:  24 in bed ate has no c/o f/c    OBJECTIVE   VITALS    axillary temperature is 98.5 °F (36.9 °C). Her blood pressure is 122/80 and her pulse is 84. Her respiration is 18 and oxygen saturation is 93%.       General Appearance:   awake and alert   HEENT:    at/nc  Neck:   supple, no mass  Lungs:    cta ant B   Cardiovascular:  s1/s2  Abdomen:   soft nt /nd  :  Neurologic:   nad no focal deficits   Skin :    no rash   Extremities:   has rom           Peripheral Intravenous Line:  Peripheral IV 24 Right Antecubital (Active)   Site Assessment Clean, dry & intact 24 08   Line Status Infusing 24 08   Line Care Connections checked and tightened 24   Phlebitis Assessment No symptoms 24 08   Infiltration Assessment 0 24   Alcohol Cap Used Yes 24 08   Dressing Status Clean, dry & intact 24   Dressing Type Transparent 24           Drain(s):  Urinary Catheter 24 Owen (Active)   Catheter Indications Assist in healing of open sacral or perineal wounds (Stage III, IV, or unstageable documented by a provider or enterostomal therapy) and/or full thickness perineal and lower extremity burns in incontinent patients 24 08   Site Assessment

## 2024-08-30 NOTE — PLAN OF CARE
Problem: Discharge Planning  Goal: Discharge to home or other facility with appropriate resources  Outcome: Progressing  Flowsheets (Taken 8/30/2024 0800)  Discharge to home or other facility with appropriate resources:   Identify barriers to discharge with patient and caregiver   Arrange for needed discharge resources and transportation as appropriate   Identify discharge learning needs (meds, wound care, etc)   Refer to discharge planning if patient needs post-hospital services based on physician order or complex needs related to functional status, cognitive ability or social support system     Problem: Skin/Tissue Integrity  Goal: Absence of new skin breakdown  Description: 1.  Monitor for areas of redness and/or skin breakdown  2.  Assess vascular access sites hourly  3.  Every 4-6 hours minimum:  Change oxygen saturation probe site  4.  Every 4-6 hours:  If on nasal continuous positive airway pressure, respiratory therapy assess nares and determine need for appliance change or resting period.  Outcome: Progressing     Problem: ABCDS Injury Assessment  Goal: Absence of physical injury  Outcome: Progressing  Flowsheets (Taken 8/30/2024 0800)  Absence of Physical Injury: Implement safety measures based on patient assessment     Problem: Safety - Adult  Goal: Free from fall injury  Outcome: Progressing  Flowsheets (Taken 8/30/2024 0800)  Free From Fall Injury:   Instruct family/caregiver on patient safety   Based on caregiver fall risk screen, instruct family/caregiver to ask for assistance with transferring infant if caregiver noted to have fall risk factors

## 2024-08-30 NOTE — PLAN OF CARE
Problem: Discharge Planning  Goal: Discharge to home or other facility with appropriate resources  Outcome: Progressing  Flowsheets  Taken 8/29/2024 1319 by Jose Gan RN  Discharge to home or other facility with appropriate resources:   Identify barriers to discharge with patient and caregiver   Identify discharge learning needs (meds, wound care, etc)   Refer to discharge planning if patient needs post-hospital services based on physician order or complex needs related to functional status, cognitive ability or social support system   Arrange for interpreters to assist at discharge as needed   Arrange for needed discharge resources and transportation as appropriate  Taken 8/29/2024 1301 by Jose Gan, RN  Discharge to home or other facility with appropriate resources:   Identify barriers to discharge with patient and caregiver   Arrange for needed discharge resources and transportation as appropriate   Identify discharge learning needs (meds, wound care, etc)   Refer to discharge planning if patient needs post-hospital services based on physician order or complex needs related to functional status, cognitive ability or social support system     Problem: Skin/Tissue Integrity  Goal: Absence of new skin breakdown  Description: 1.  Monitor for areas of redness and/or skin breakdown  2.  Assess vascular access sites hourly  3.  Every 4-6 hours minimum:  Change oxygen saturation probe site  4.  Every 4-6 hours:  If on nasal continuous positive airway pressure, respiratory therapy assess nares and determine need for appliance change or resting period.  Outcome: Progressing     Problem: ABCDS Injury Assessment  Goal: Absence of physical injury  Outcome: Progressing  Flowsheets (Taken 8/29/2024 1301 by Jose Gan, RN)  Absence of Physical Injury: Implement safety measures based on patient assessment     Problem: Safety - Adult  Goal: Free from fall injury  Outcome: Progressing

## 2024-08-31 ENCOUNTER — APPOINTMENT (OUTPATIENT)
Dept: CT IMAGING | Age: 79
DRG: 641 | End: 2024-08-31
Payer: MEDICARE

## 2024-08-31 LAB
ALBUMIN SERPL-MCNC: 3.2 G/DL (ref 3.5–5.2)
ALP SERPL-CCNC: 65 U/L (ref 35–104)
ALT SERPL-CCNC: 9 U/L (ref 0–32)
ANION GAP SERPL CALCULATED.3IONS-SCNC: 9 MMOL/L (ref 7–16)
AST SERPL-CCNC: 14 U/L (ref 0–31)
BASOPHILS # BLD: 0.03 K/UL (ref 0–0.2)
BASOPHILS NFR BLD: 1 % (ref 0–2)
BILIRUB SERPL-MCNC: 0.3 MG/DL (ref 0–1.2)
BUN SERPL-MCNC: 7 MG/DL (ref 6–23)
CALCIUM SERPL-MCNC: 8.3 MG/DL (ref 8.6–10.2)
CHLORIDE SERPL-SCNC: 93 MMOL/L (ref 98–107)
CO2 SERPL-SCNC: 28 MMOL/L (ref 22–29)
CREAT SERPL-MCNC: 0.4 MG/DL (ref 0.5–1)
EOSINOPHIL # BLD: 0.19 K/UL (ref 0.05–0.5)
EOSINOPHILS RELATIVE PERCENT: 3 % (ref 0–6)
ERYTHROCYTE [DISTWIDTH] IN BLOOD BY AUTOMATED COUNT: 14.4 % (ref 11.5–15)
GFR, ESTIMATED: >90 ML/MIN/1.73M2
GLUCOSE SERPL-MCNC: 107 MG/DL (ref 74–99)
HCT VFR BLD AUTO: 37.9 % (ref 34–48)
HGB BLD-MCNC: 13 G/DL (ref 11.5–15.5)
IMM GRANULOCYTES # BLD AUTO: 0.03 K/UL (ref 0–0.58)
IMM GRANULOCYTES NFR BLD: 1 % (ref 0–5)
LYMPHOCYTES NFR BLD: 0.9 K/UL (ref 1.5–4)
LYMPHOCYTES RELATIVE PERCENT: 14 % (ref 20–42)
MCH RBC QN AUTO: 30 PG (ref 26–35)
MCHC RBC AUTO-ENTMCNC: 34.3 G/DL (ref 32–34.5)
MCV RBC AUTO: 87.3 FL (ref 80–99.9)
MONOCYTES NFR BLD: 0.62 K/UL (ref 0.1–0.95)
MONOCYTES NFR BLD: 9 % (ref 2–12)
NEUTROPHILS NFR BLD: 73 % (ref 43–80)
NEUTS SEG NFR BLD: 4.82 K/UL (ref 1.8–7.3)
PLATELET # BLD AUTO: 369 K/UL (ref 130–450)
PMV BLD AUTO: 8.6 FL (ref 7–12)
POTASSIUM SERPL-SCNC: 3.6 MMOL/L (ref 3.5–5)
PROT SERPL-MCNC: 6 G/DL (ref 6.4–8.3)
RBC # BLD AUTO: 4.34 M/UL (ref 3.5–5.5)
SODIUM SERPL-SCNC: 130 MMOL/L (ref 132–146)
WBC OTHER # BLD: 6.6 K/UL (ref 4.5–11.5)

## 2024-08-31 PROCEDURE — 2580000003 HC RX 258

## 2024-08-31 PROCEDURE — 36415 COLL VENOUS BLD VENIPUNCTURE: CPT

## 2024-08-31 PROCEDURE — 6370000000 HC RX 637 (ALT 250 FOR IP)

## 2024-08-31 PROCEDURE — 85025 COMPLETE CBC W/AUTO DIFF WBC: CPT

## 2024-08-31 PROCEDURE — 6360000002 HC RX W HCPCS

## 2024-08-31 PROCEDURE — 71250 CT THORAX DX C-: CPT

## 2024-08-31 PROCEDURE — 2060000000 HC ICU INTERMEDIATE R&B

## 2024-08-31 PROCEDURE — 97161 PT EVAL LOW COMPLEX 20 MIN: CPT | Performed by: PHYSICAL THERAPIST

## 2024-08-31 PROCEDURE — 80053 COMPREHEN METABOLIC PANEL: CPT

## 2024-08-31 PROCEDURE — 6370000000 HC RX 637 (ALT 250 FOR IP): Performed by: SPECIALIST

## 2024-08-31 RX ORDER — ERGOCALCIFEROL 1.25 MG/1
50000 CAPSULE, LIQUID FILLED ORAL WEEKLY
Status: DISCONTINUED | OUTPATIENT
Start: 2024-08-31 | End: 2024-09-02 | Stop reason: HOSPADM

## 2024-08-31 RX ADMIN — QUETIAPINE FUMARATE 50 MG: 25 TABLET ORAL at 22:26

## 2024-08-31 RX ADMIN — DONEPEZIL HYDROCHLORIDE 5 MG: 5 TABLET, FILM COATED ORAL at 22:26

## 2024-08-31 RX ADMIN — MIRTAZAPINE 15 MG: 15 TABLET, FILM COATED ORAL at 22:26

## 2024-08-31 RX ADMIN — MICONAZOLE NITRATE: 2 OINTMENT TOPICAL at 11:25

## 2024-08-31 RX ADMIN — PANTOPRAZOLE SODIUM 40 MG: 40 TABLET, DELAYED RELEASE ORAL at 06:20

## 2024-08-31 RX ADMIN — TRIAMCINOLONE ACETONIDE: 1 OINTMENT TOPICAL at 11:24

## 2024-08-31 RX ADMIN — ENOXAPARIN SODIUM 40 MG: 100 INJECTION SUBCUTANEOUS at 11:24

## 2024-08-31 RX ADMIN — SODIUM CHLORIDE: 9 INJECTION, SOLUTION INTRAVENOUS at 06:21

## 2024-08-31 RX ADMIN — DOXYCYCLINE HYCLATE 100 MG: 100 CAPSULE ORAL at 22:26

## 2024-08-31 RX ADMIN — MICONAZOLE NITRATE: 2 OINTMENT TOPICAL at 22:26

## 2024-08-31 RX ADMIN — SODIUM CHLORIDE: 9 INJECTION, SOLUTION INTRAVENOUS at 22:38

## 2024-08-31 RX ADMIN — DOXYCYCLINE HYCLATE 100 MG: 100 CAPSULE ORAL at 11:23

## 2024-08-31 RX ADMIN — TRIAMCINOLONE ACETONIDE: 1 OINTMENT TOPICAL at 22:27

## 2024-08-31 RX ADMIN — ERGOCALCIFEROL 50000 UNITS: 1.25 CAPSULE ORAL at 14:05

## 2024-08-31 NOTE — PROGRESS NOTES
Patient - Aracelis Murrieta,  Age - 79 y.o.    - 1945      Room Number - 0539/0539-02   MRN -  [id number]   Located within [facility] # - [id number]  Date of Admission -  2024  7:13 AM    Problem list of patient:     Hospital Problems             Last Modified POA    * (Principal) Hyponatremia 2024 Yes     ASSESSMENT/PLAN       Cellulitis post back   Dermatitis post back   Uti  Urine cx gnr  Suggest consulting wound care/ostomy nurse  Continue wound care  Encourage nutritional support  Continue to off load wound/pressure relief bed  Try triamcinolone/miconazole applied to back  doxycycline   Aso mrsa screen         SUBJECTIVE:   DOS:  24 spitting up no f/c/n/v/d   in bed ate has no c/o f/c    OBJECTIVE   VITALS    infrared temperature is 97.2 °F (36.2 °C). Her blood pressure is 157/76 (abnormal) and her pulse is 103 (abnormal). Her respiration is 18 and oxygen saturation is 93%.       General Appearance:   awake and alert   HEENT:    at/nc  Neck:   supple, no mass  Lungs:    cta ant B   Cardiovascular:  s1/s2  Abdomen:   soft nt /nd  :  Neurologic:   nad no focal deficits   Skin :    no rash  post bacl less red skin drying  Extremities:   has rom           Peripheral Intravenous Line:  Peripheral IV 24 Right Antecubital (Active)   Site Assessment Clean, dry & intact 24 0800   Line Status Infusing 24 0800   Line Care Connections checked and tightened 24   Phlebitis Assessment No symptoms 24 08   Infiltration Assessment 0 24 08   Alcohol Cap Used Yes 24 0800   Dressing Status Clean, dry & intact 24 0800   Dressing Type Transparent 24 08           Drain(s):  Urinary Catheter 24 Owen (Active)   Catheter Indications Assist in healing of open sacral or perineal wounds (Stage III, IV, or unstageable documented by a provider or enterostomal therapy) and/or full thickness

## 2024-08-31 NOTE — PROGRESS NOTES
Internal Medicine Consult Note    [facility]    Cesar Mcdowell D.O., MANSOORORichardI.                    Caleb Asher D.O., MANSOORORichardI.                             Carrington Joya D.O.     Tiki Garcias, MSN, APRN, NP-C  Fortunato Rondon, MSN, APRN-CNP  Jairo Aaron, MSN, APRN-CNP  Lauren Pearson, MSN APRN-CNP  Niharika Arenas, MSN. APRN-NP-C     Primary Care Physician: Michael Brcok DO   Admitting Physician:  Cesar Mcdowell DO  Admission date and time: 8/29/2024  7:13 AM    Room:  [address] [place] [address]  Admitting diagnosis: Hyponatremia [E87.1]  FTT (failure to thrive) in adult [R62.7]  Pressure ulcers of skin of multiple topographic sites [L89.90]    Patient Name: Aracelis Murrieta  MRN: [id number]    Date of Service: 8/31/2024     Subjective:  Aracelis is a 79 y.o. female who was seen and examined today,8/31/2024, at the bedside.  Patient is resting comfortably and appears more alert today.  There is no family present at the moment.  She denies any pain or discomfort.  She states she is eating a little better.  Her sodium is up to 129 today.    No family present during my examination.    Review of System:   Constitutional:   Denies fever or chills, weight loss or gain, improving fatigue  HEENT:   Denies ear pain, sore throat, sinus or eye problems.  Reports hard of hearing  Cardiovascular:   Denies any chest pain, irregular heartbeats, or palpitations.   Respiratory:   Denies shortness of breath, coughing, sputum production, hemoptysis, or wheezing.  Gastrointestinal:   Denies nausea, vomiting, diarrhea, or constipation.  Denies any abdominal pain.  Genitourinary:    Denies any urgency, frequency, hematuria. Voiding  without difficulty.  Extremities:   Denies lower extremity swelling, edema or cyanosis.   Neurology:    Denies any headache or focal neurological deficits, Denies generalized weakness or memory difficulty.   Psch:   Denies being anxious or depressed.  Musculoskeletal:    Denies  myalgias,

## 2024-08-31 NOTE — PROGRESS NOTES
Internal Medicine Consult Note    [facility]    Cesar Mcdowell D.O., MANSOORORichardI.                    Caleb Asher D.O., MANSOORORichardI.                             Carrington Joya D.O.     Tiki Garcias, MSN, APRN, NP-C  Fortunato Rondon, MSN, APRN-CNP  Jairo Aaron, MSN, APRN-CNP  Lauren Pearson, MSN APRN-CNP  Niharika Arenas, MSN. APRN-NP-C     Primary Care Physician: Michael Brock DO   Admitting Physician:  Cesar Mcdowell DO  Admission date and time: 8/29/2024  7:13 AM    Room:  [address] [place] [address]  Admitting diagnosis: Hyponatremia [E87.1]  FTT (failure to thrive) in adult [R62.7]  Pressure ulcers of skin of multiple topographic sites [L89.90]    Patient Name: Aracelis Murrieta  MRN: [id number]    Date of Service: 8/31/2024     Subjective:  Aracelis is a 79 y.o. female who was seen and examined today,8/31/2024, at the bedside.  Patient is resting comfortably and appears more alert today.  There is no family present at the moment.  She denies any pain or discomfort.  She states she is eating a little better.  Will continue to monitor labs sodium up to 129 yesterday    No family present during my examination.    Review of System:   Constitutional:   Denies fever or chills, weight loss or gain, improving fatigue  HEENT:   Denies ear pain, sore throat, sinus or eye problems.  Reports hard of hearing  Cardiovascular:   Denies any chest pain, irregular heartbeats, or palpitations.   Respiratory:   Denies shortness of breath, coughing, sputum production, hemoptysis, or wheezing.  Gastrointestinal:   Denies nausea, vomiting, diarrhea, or constipation.  Denies any abdominal pain.  Genitourinary:    Denies any urgency, frequency, hematuria. Voiding  without difficulty.  Extremities:   Denies lower extremity swelling, edema or cyanosis.   Neurology:    Denies any headache or focal neurological deficits, Denies generalized weakness or memory difficulty.   Psch:   Denies being anxious or  depressed.  Musculoskeletal:    Denies  myalgias, joint complaints or back pain.  Generalized weakness  Integumentary:   Reports ulcerations on lower back and buttocks  Hematologic/Lymphatic:  Denies bruising or bleeding.    Physical Exam:  I/O this shift:  In: -   Out: 750 [Urine:750]    Intake/Output Summary (Last 24 hours) at 8/31/2024 1511  Last data filed at 8/31/2024 1409  Gross per 24 hour   Intake --   Output 2200 ml   Net -2200 ml   I/O last 3 completed shifts:  In: -   Out: 2400 [Urine:2400]  No data found.  Vital Signs:   Blood pressure (!) 172/80, pulse 86, temperature 97.1 °F (36.2 °C), temperature source Infrared, resp. rate 18, SpO2 94%.    General appearance:  Alert, responsive, oriented to person, place, and time.  Appears malnourished no distress.  Head:  Normocephalic. No masses, lesions or tenderness.  Eyes:  PERRLA.  EOMI.  Sclera clear.  Buccal mucosa moist.  ENT:  Ears normal. Mucosa normal.  Neck:    Supple. Trachea midline. No thyromegaly. No JVD. No bruits.  Heart:    Rhythm regular. Rate controlled.  No murmurs.  Lungs:    Symmetrical. Clear to auscultation bilaterally.  No wheezes. No rhonchi. No rales.  Abdomen:   Soft. Non-tender. Non-distended. Bowel sounds positive. No organomegaly or masses.  No pain on palpation.  Extremities:    Peripheral pulses present.  No peripheral edema.  No ulcers. No cyanosis. No clubbing.  Neurologic:    Alert x 3.  No focal deficit.  Cranial nerves grossly intact. No focal weakness.  Psych:   Behavior is normal.  Somewhat depressed since 's suicide on November 14. Speech is not rapid and/or pressured.  Musculoskeletal:   Spine ROM normal. Muscular strength intact. Gait not assessed.  Generalized weakness  Integumentary:  See wound pictures  Genitalia/Breast:  Deferred    Medication:  Scheduled Meds:   vitamin D  50,000 Units Oral Weekly    donepezil  5 mg Oral Nightly    QUEtiapine  50 mg Oral Nightly    pantoprazole  40 mg Oral QAM AC

## 2024-08-31 NOTE — PLAN OF CARE
Problem: Discharge Planning  Goal: Discharge to home or other facility with appropriate resources  8/30/2024 2307 by Brigid Dunham RN  Outcome: Progressing  8/30/2024 1831 by Jose Gan RN  Outcome: Progressing  Flowsheets (Taken 8/30/2024 0800)  Discharge to home or other facility with appropriate resources:   Identify barriers to discharge with patient and caregiver   Arrange for needed discharge resources and transportation as appropriate   Identify discharge learning needs (meds, wound care, etc)   Refer to discharge planning if patient needs post-hospital services based on physician order or complex needs related to functional status, cognitive ability or social support system     Problem: Skin/Tissue Integrity  Goal: Absence of new skin breakdown  Description: 1.  Monitor for areas of redness and/or skin breakdown  2.  Assess vascular access sites hourly  3.  Every 4-6 hours minimum:  Change oxygen saturation probe site  4.  Every 4-6 hours:  If on nasal continuous positive airway pressure, respiratory therapy assess nares and determine need for appliance change or resting period.  8/30/2024 2307 by Brigid Dunham RN  Outcome: Progressing  8/30/2024 1831 by Jose Gan RN  Outcome: Progressing     Problem: ABCDS Injury Assessment  Goal: Absence of physical injury  8/30/2024 2307 by Brigid Dunham RN  Outcome: Progressing  8/30/2024 1831 by Jose Gan RN  Outcome: Progressing  Flowsheets (Taken 8/30/2024 0800)  Absence of Physical Injury: Implement safety measures based on patient assessment     Problem: Safety - Adult  Goal: Free from fall injury  8/30/2024 2307 by Brigid Dunham RN  Outcome: Progressing  8/30/2024 1831 by Jose Gan RN  Outcome: Progressing  Flowsheets (Taken 8/30/2024 0800)  Free From Fall Injury:   Instruct family/caregiver on patient safety   Based on caregiver fall risk screen, instruct family/caregiver to ask for assistance with  transferring infant if caregiver noted to have fall risk factors

## 2024-08-31 NOTE — PROGRESS NOTES
Internal Medicine Consult Note    [facility]    Cesar Mcdowell D.O., MANSOORORichardI.                    Caleb Asher D.O., MANSOORORichardI.                             Carrington Joya D.O.     Tiki Garcias, MSN, APRN, NP-C  Fortunato Rondon, MSN, APRN-CNP  Jairo Aaron, MSN, APRN-CNP  Lauren Pearson, MSN APRN-CNP  Niharika Arenas, MSN. APRN-NP-C     Primary Care Physician: Michael Brock DO   Admitting Physician:  Cesar Mcdowell DO  Admission date and time: 8/29/2024  7:13 AM    Room:  [address] [place] [place] [address]  Admitting diagnosis: Hyponatremia [E87.1]  FTT (failure to thrive) in adult [R62.7]  Pressure ulcers of skin of multiple topographic sites [L89.90]    Patient Name: Aracelis Murrieta  MRN: [id number]    Date of Service: 8/31/2024     Subjective:  Aracelis is a 79 y.o. female who was seen and examined today,8/31/2024, at the bedside.  Patient is resting comfortably and appears more alert today.  There is no family present at the moment.  She denies any pain or discomfort.  She states she is eating a little better.  Will continue to monitor labs sodium up to 129 yesterday    No family present during my examination.    Review of System:   Constitutional:   Denies fever or chills, weight loss or gain, improving fatigue  HEENT:   Denies ear pain, sore throat, sinus or eye problems.  Reports hard of hearing  Cardiovascular:   Denies any chest pain, irregular heartbeats, or palpitations.   Respiratory:   Denies shortness of breath, coughing, sputum production, hemoptysis, or wheezing.  Gastrointestinal:   Denies nausea, vomiting, diarrhea, or constipation.  Denies any abdominal pain.  Genitourinary:    Denies any urgency, frequency, hematuria. Voiding  without difficulty.  Extremities:   Denies lower extremity swelling, edema or cyanosis.   Neurology:    Denies any headache or focal neurological deficits, Denies generalized weakness or memory difficulty.   Psch:   Denies being anxious or

## 2024-09-01 LAB
ALBUMIN SERPL-MCNC: 3.2 G/DL (ref 3.5–5.2)
ALP SERPL-CCNC: 64 U/L (ref 35–104)
ALT SERPL-CCNC: 10 U/L (ref 0–32)
ANION GAP SERPL CALCULATED.3IONS-SCNC: 7 MMOL/L (ref 7–16)
AST SERPL-CCNC: 14 U/L (ref 0–31)
BASOPHILS # BLD: 0.03 K/UL (ref 0–0.2)
BASOPHILS NFR BLD: 1 % (ref 0–2)
BILIRUB SERPL-MCNC: 0.3 MG/DL (ref 0–1.2)
BUN SERPL-MCNC: 10 MG/DL (ref 6–23)
CALCIUM SERPL-MCNC: 8.2 MG/DL (ref 8.6–10.2)
CHLORIDE SERPL-SCNC: 94 MMOL/L (ref 98–107)
CO2 SERPL-SCNC: 29 MMOL/L (ref 22–29)
CREAT SERPL-MCNC: 0.4 MG/DL (ref 0.5–1)
EKG ATRIAL RATE: 88 BPM
EKG P AXIS: 69 DEGREES
EKG P-R INTERVAL: 216 MS
EKG Q-T INTERVAL: 380 MS
EKG QRS DURATION: 90 MS
EKG QTC CALCULATION (BAZETT): 459 MS
EKG R AXIS: 28 DEGREES
EKG T AXIS: 64 DEGREES
EKG VENTRICULAR RATE: 88 BPM
EOSINOPHIL # BLD: 0.18 K/UL (ref 0.05–0.5)
EOSINOPHILS RELATIVE PERCENT: 3 % (ref 0–6)
ERYTHROCYTE [DISTWIDTH] IN BLOOD BY AUTOMATED COUNT: 14.5 % (ref 11.5–15)
GFR, ESTIMATED: >90 ML/MIN/1.73M2
GLUCOSE SERPL-MCNC: 97 MG/DL (ref 74–99)
HCT VFR BLD AUTO: 36.4 % (ref 34–48)
HGB BLD-MCNC: 12.3 G/DL (ref 11.5–15.5)
IMM GRANULOCYTES # BLD AUTO: <0.03 K/UL (ref 0–0.58)
IMM GRANULOCYTES NFR BLD: 0 % (ref 0–5)
LYMPHOCYTES NFR BLD: 0.68 K/UL (ref 1.5–4)
LYMPHOCYTES RELATIVE PERCENT: 12 % (ref 20–42)
MCH RBC QN AUTO: 29.2 PG (ref 26–35)
MCHC RBC AUTO-ENTMCNC: 33.8 G/DL (ref 32–34.5)
MCV RBC AUTO: 86.5 FL (ref 80–99.9)
MONOCYTES NFR BLD: 0.54 K/UL (ref 0.1–0.95)
MONOCYTES NFR BLD: 9 % (ref 2–12)
NEUTROPHILS NFR BLD: 75 % (ref 43–80)
NEUTS SEG NFR BLD: 4.36 K/UL (ref 1.8–7.3)
PLATELET # BLD AUTO: 343 K/UL (ref 130–450)
PMV BLD AUTO: 8.4 FL (ref 7–12)
POTASSIUM SERPL-SCNC: 3.9 MMOL/L (ref 3.5–5)
PROT SERPL-MCNC: 5.5 G/DL (ref 6.4–8.3)
RBC # BLD AUTO: 4.21 M/UL (ref 3.5–5.5)
SODIUM SERPL-SCNC: 130 MMOL/L (ref 132–146)
WBC OTHER # BLD: 5.8 K/UL (ref 4.5–11.5)

## 2024-09-01 PROCEDURE — 85025 COMPLETE CBC W/AUTO DIFF WBC: CPT

## 2024-09-01 PROCEDURE — 6370000000 HC RX 637 (ALT 250 FOR IP)

## 2024-09-01 PROCEDURE — 2060000000 HC ICU INTERMEDIATE R&B

## 2024-09-01 PROCEDURE — 6370000000 HC RX 637 (ALT 250 FOR IP): Performed by: SPECIALIST

## 2024-09-01 PROCEDURE — 80053 COMPREHEN METABOLIC PANEL: CPT

## 2024-09-01 PROCEDURE — 36415 COLL VENOUS BLD VENIPUNCTURE: CPT

## 2024-09-01 PROCEDURE — 87081 CULTURE SCREEN ONLY: CPT

## 2024-09-01 PROCEDURE — 6360000002 HC RX W HCPCS

## 2024-09-01 RX ORDER — LOSARTAN POTASSIUM 50 MG/1
25 TABLET ORAL DAILY
Status: DISCONTINUED | OUTPATIENT
Start: 2024-09-01 | End: 2024-09-02 | Stop reason: HOSPADM

## 2024-09-01 RX ADMIN — DOXYCYCLINE HYCLATE 100 MG: 100 CAPSULE ORAL at 21:05

## 2024-09-01 RX ADMIN — MICONAZOLE NITRATE: 2 OINTMENT TOPICAL at 21:06

## 2024-09-01 RX ADMIN — ENOXAPARIN SODIUM 40 MG: 100 INJECTION SUBCUTANEOUS at 08:45

## 2024-09-01 RX ADMIN — MICONAZOLE NITRATE: 2 OINTMENT TOPICAL at 09:00

## 2024-09-01 RX ADMIN — TRIAMCINOLONE ACETONIDE: 1 OINTMENT TOPICAL at 08:46

## 2024-09-01 RX ADMIN — TRIAMCINOLONE ACETONIDE: 1 OINTMENT TOPICAL at 21:06

## 2024-09-01 RX ADMIN — LOSARTAN POTASSIUM 25 MG: 50 TABLET, FILM COATED ORAL at 08:45

## 2024-09-01 RX ADMIN — DONEPEZIL HYDROCHLORIDE 5 MG: 5 TABLET, FILM COATED ORAL at 21:05

## 2024-09-01 RX ADMIN — DOXYCYCLINE HYCLATE 100 MG: 100 CAPSULE ORAL at 08:45

## 2024-09-01 RX ADMIN — MIRTAZAPINE 15 MG: 15 TABLET, FILM COATED ORAL at 21:05

## 2024-09-01 RX ADMIN — QUETIAPINE FUMARATE 50 MG: 25 TABLET ORAL at 21:05

## 2024-09-01 NOTE — PROGRESS NOTES
Internal Medicine Consult Note    [facility]    Cesar Mcdowell D.O., MANSOORORichardI.                    Caleb Asher D.O., MANSOORORichardI.                             Carrington Joya D.O.     Tiki Garcias, MSN, APRN, NP-C  Fortunato Rondon, MSN, APRN-CNP  Jairo Aaron, MSN, APRN-CNP  Lauren Pearson, MSN APRN-CNP  Niharika Arenas, MSN. APRN-NP-C     Primary Care Physician: Michael Brock DO   Admitting Physician:  Cesar Mcdowell DO  Admission date and time: 8/29/2024  7:13 AM    Room:  [address] [place] [address]  Admitting diagnosis: Hyponatremia [E87.1]  FTT (failure to thrive) in adult [R62.7]  Pressure ulcers of skin of multiple topographic sites [L89.90]    Patient Name: Aracelis Murrieta  MRN: [id number]    Date of Service: 9/1/2024     Subjective:  Aracelis is a 79 y.o. female who was seen and examined today,9/1/2024, at the bedside.  Patient is sitting up in bed eating lunch.  Her daughter is currently at the bedside.  Patient denies any pain or discomfort.  Her sodium is up to 130 today we will discontinue IV fluids and continue to monitor sodium levels.  No family present during my examination.    Review of System:   Constitutional:   Denies fever or chills, weight loss or gain, improving fatigue  HEENT:   Denies ear pain, sore throat, sinus or eye problems.  Reports hard of hearing  Cardiovascular:   Denies any chest pain, irregular heartbeats, or palpitations.   Respiratory:   Denies shortness of breath, coughing, sputum production, hemoptysis, or wheezing.  Gastrointestinal:   Denies nausea, vomiting, diarrhea, or constipation.  Denies any abdominal pain.  Genitourinary:    Denies any urgency, frequency, hematuria. Voiding  without difficulty.  Extremities:   Denies lower extremity swelling, edema or cyanosis.   Neurology:    Denies any headache or focal neurological deficits, Denies generalized weakness or memory difficulty.   Psch:   Denies being anxious or depressed.  Musculoskeletal:    Denies   supplements  Start Remeron  Wound care to eval and treat  PT and OT  Check CT scan of the chest  Infectious disease following  Monitor labs and vitals  Stop IV fluids continue to monitor sodium levels    Continue current therapy.  See orders for further plan of care.    More than 50% of my  time was spent at the bedside counseling/coordinating care with the patient and/or family with face to face contact.  This time was spent reviewing notes and laboratory data as well as instructing and counseling the patient. Time I spent with the family or surrogate(s) is included only if the patient was incapable of providing the necessary information or participating in medical decisions. I also discussed the differential diagnosis and all of the proposed management plans with the patient and individuals accompanying the patient.    The patient was seen, examined and then discussed with Dr. Mcdowell.     MARK Medellin CNP  9/1/2024  4:16 PM        I saw and evaluated the patient. I agree with the findings and the plan of care as documented in Jairo FLORES-CNP note.    Cesar Mcdowell DO, FACOI  4:58 PM  9/1/2024

## 2024-09-01 NOTE — PROGRESS NOTES
Patient - Aracelis Murrieta,  Age - 79 y.o.    - 1945      Room Number - 0539/0539-01   MRN -  [id number]   [facility] # - [id number]  Date of Admission -  2024  7:13 AM    Problem list of patient:     Hospital Problems             Last Modified POA    * (Principal) Hyponatremia 2024 Yes     ASSESSMENT/PLAN       Cellulitis post back improved  Dermatitis post back  improved  Uti  Urine cx E coli sensi pending      Continue wound care  Encourage nutritional support  Continue to off load wound/pressure relief bed  Try triamcinolone/miconazole applied to back  doxycycline day 4  Aso 21 mrsa screen pending         SUBJECTIVE:   DOS:  24 in bed has no c/o f/c   spitting up no f/c/n/v/d   in bed ate has no c/o f/c    OBJECTIVE   VITALS    oral temperature is 97.8 °F (36.6 °C). Her blood pressure is 142/72 (abnormal) and her pulse is 100. Her respiration is 18 and oxygen saturation is 93%.       General Appearance:   awake and alert   HEENT:    at/nc  Neck:   supple, no mass  Lungs:    cta ant B   Cardiovascular:  s1/s2  Abdomen:   soft nt /nd  :  Neurologic:   nad no focal deficits   Skin :    no rash  post back less red skin drying satble  Extremities:   has rom           Peripheral Intravenous Line:  Peripheral IV 24 Right Antecubital (Active)   Site Assessment Clean, dry & intact 24 0800   Line Status Infusing 24 08   Line Care Connections checked and tightened 24   Phlebitis Assessment No symptoms 24 08   Infiltration Assessment 0 24 0800   Alcohol Cap Used Yes 24 0800   Dressing Status Clean, dry & intact 24 08   Dressing Type Transparent 24           Drain(s):  Urinary Catheter 24 Owen (Active)   Catheter Indications Assist in healing of open sacral or perineal wounds (Stage III, IV, or unstageable documented by a provider or enterostomal  therapy) and/or full thickness perineal and lower extremity burns in incontinent patients 08/30/24 0800   Site Assessment Pink;No urethral drainage 08/30/24 0800   Urine Color Yellow 08/30/24 0800   Urine Appearance Clear 08/30/24 0800   Urine Odor Malodorous 08/30/24 0800   Collection Container Standard 08/30/24 0800   Securement Method Securing device (Describe) 08/30/24 0800   Catheter Care  Perineal wipes 08/30/24 1145   Status Draining 08/29/24 1803   Output (mL) 500 mL 08/30/24 1145              MEDICATIONS:      losartan  25 mg Oral Daily    vitamin D  50,000 Units Oral Weekly    donepezil  5 mg Oral Nightly    QUEtiapine  50 mg Oral Nightly    pantoprazole  40 mg Oral QAM AC    enoxaparin  40 mg SubCUTAneous Daily    miconazole nitrate   Topical BID    triamcinolone   Topical BID    doxycycline hyclate  100 mg Oral 2 times per day    mirtazapine  15 mg Oral Nightly         diphenhydrAMINE    LABS:     Recent Labs     08/30/24 1215 08/31/24 1741 09/01/24  0749   WBC 8.8 6.6 5.8   HGB 12.8 13.0 12.3    369 343     Recent Labs     08/30/24 1215 08/31/24 1741 09/01/24  0749   *  124* 130* 130*   K 3.2*  3.1* 3.6 3.9   CL 88*  87* 93* 94*   CO2 28  29 28 29   BUN 7  7 7 10   CREATININE 0.4*  0.3* 0.4* 0.4*   GLUCOSE 111*  110* 107* 97     Recent Labs     08/30/24 1215 08/31/24 1741 09/01/24  0749   ALKPHOS 71  72 65 64   ALT 10  9 9 10   AST 14  19 14 14   BILITOT 0.4  0.4 0.3 0.3     No results for input(s): \"CPK\" in the last 72 hours.  No results for input(s): \"ESR\" in the last 72 hours.  No results for input(s): \"CRP\" in the last 72 hours.    Recent Labs     08/30/24  1215   PROCAL <0.02     No results for input(s): \"LACTA\" in the last 72 hours.         CULTURES:   UA:   Recent Labs     08/29/24  1746   PHUR 7.5   COLORU Yellow   PROTEINU NEGATIVE   RBCUA 0 TO 2   WBCUA 6 TO 9*   BACTERIA 3+*   NITRU POSITIVE*   GLUCOSEU NEGATIVE   BILIRUBINUR NEGATIVE   UROBILINOGEN 2.0*   KETUA

## 2024-09-02 VITALS
OXYGEN SATURATION: 97 % | HEART RATE: 89 BPM | SYSTOLIC BLOOD PRESSURE: 167 MMHG | TEMPERATURE: 97.8 F | RESPIRATION RATE: 18 BRPM | DIASTOLIC BLOOD PRESSURE: 75 MMHG

## 2024-09-02 LAB
ALBUMIN SERPL-MCNC: 3.2 G/DL (ref 3.5–5.2)
ALP SERPL-CCNC: 69 U/L (ref 35–104)
ALT SERPL-CCNC: 12 U/L (ref 0–32)
ANION GAP SERPL CALCULATED.3IONS-SCNC: 8 MMOL/L (ref 7–16)
AST SERPL-CCNC: 20 U/L (ref 0–31)
BASOPHILS # BLD: 0.03 K/UL (ref 0–0.2)
BASOPHILS NFR BLD: 1 % (ref 0–2)
BILIRUB SERPL-MCNC: 0.3 MG/DL (ref 0–1.2)
BUN SERPL-MCNC: 11 MG/DL (ref 6–23)
CALCIUM SERPL-MCNC: 8.4 MG/DL (ref 8.6–10.2)
CHLORIDE SERPL-SCNC: 92 MMOL/L (ref 98–107)
CO2 SERPL-SCNC: 30 MMOL/L (ref 22–29)
CREAT SERPL-MCNC: 0.4 MG/DL (ref 0.5–1)
EOSINOPHIL # BLD: 0.21 K/UL (ref 0.05–0.5)
EOSINOPHILS RELATIVE PERCENT: 4 % (ref 0–6)
ERYTHROCYTE [DISTWIDTH] IN BLOOD BY AUTOMATED COUNT: 14.7 % (ref 11.5–15)
GFR, ESTIMATED: >90 ML/MIN/1.73M2
GLUCOSE SERPL-MCNC: 113 MG/DL (ref 74–99)
HCT VFR BLD AUTO: 38.1 % (ref 34–48)
HGB BLD-MCNC: 12.7 G/DL (ref 11.5–15.5)
IMM GRANULOCYTES # BLD AUTO: <0.03 K/UL (ref 0–0.58)
IMM GRANULOCYTES NFR BLD: 0 % (ref 0–5)
LYMPHOCYTES NFR BLD: 0.65 K/UL (ref 1.5–4)
LYMPHOCYTES RELATIVE PERCENT: 12 % (ref 20–42)
MCH RBC QN AUTO: 29.1 PG (ref 26–35)
MCHC RBC AUTO-ENTMCNC: 33.3 G/DL (ref 32–34.5)
MCV RBC AUTO: 87.4 FL (ref 80–99.9)
MICROORGANISM SPEC CULT: ABNORMAL
MONOCYTES NFR BLD: 0.53 K/UL (ref 0.1–0.95)
MONOCYTES NFR BLD: 9 % (ref 2–12)
NEUTROPHILS NFR BLD: 74 % (ref 43–80)
NEUTS SEG NFR BLD: 4.18 K/UL (ref 1.8–7.3)
PLATELET # BLD AUTO: 368 K/UL (ref 130–450)
PMV BLD AUTO: 8.5 FL (ref 7–12)
POTASSIUM SERPL-SCNC: 4.1 MMOL/L (ref 3.5–5)
PROT SERPL-MCNC: 5.9 G/DL (ref 6.4–8.3)
RBC # BLD AUTO: 4.36 M/UL (ref 3.5–5.5)
SERVICE CMNT-IMP: ABNORMAL
SODIUM SERPL-SCNC: 130 MMOL/L (ref 132–146)
SPECIMEN DESCRIPTION: ABNORMAL
WBC OTHER # BLD: 5.6 K/UL (ref 4.5–11.5)

## 2024-09-02 PROCEDURE — 85025 COMPLETE CBC W/AUTO DIFF WBC: CPT

## 2024-09-02 PROCEDURE — 6360000002 HC RX W HCPCS

## 2024-09-02 PROCEDURE — 36415 COLL VENOUS BLD VENIPUNCTURE: CPT

## 2024-09-02 PROCEDURE — 80053 COMPREHEN METABOLIC PANEL: CPT

## 2024-09-02 PROCEDURE — 6370000000 HC RX 637 (ALT 250 FOR IP)

## 2024-09-02 PROCEDURE — 6370000000 HC RX 637 (ALT 250 FOR IP): Performed by: SPECIALIST

## 2024-09-02 RX ORDER — DIPHENHYDRAMINE HCL 25 MG
25 TABLET ORAL EVERY 6 HOURS PRN
DISCHARGE
Start: 2024-09-02 | End: 2024-10-02

## 2024-09-02 RX ORDER — LOSARTAN POTASSIUM 25 MG/1
25 TABLET ORAL DAILY
DISCHARGE
Start: 2024-09-03

## 2024-09-02 RX ORDER — SULFAMETHOXAZOLE/TRIMETHOPRIM 800-160 MG
1 TABLET ORAL DAILY
Status: DISCONTINUED | OUTPATIENT
Start: 2024-09-02 | End: 2024-09-02 | Stop reason: HOSPADM

## 2024-09-02 RX ORDER — PANTOPRAZOLE SODIUM 40 MG/1
40 TABLET, DELAYED RELEASE ORAL
DISCHARGE
Start: 2024-09-03

## 2024-09-02 RX ORDER — MIRTAZAPINE 15 MG/1
15 TABLET, FILM COATED ORAL NIGHTLY
DISCHARGE
Start: 2024-09-02

## 2024-09-02 RX ORDER — ERGOCALCIFEROL 1.25 MG/1
50000 CAPSULE ORAL WEEKLY
DISCHARGE
Start: 2024-09-07

## 2024-09-02 RX ORDER — TRIAMCINOLONE ACETONIDE 1 MG/G
OINTMENT TOPICAL
DISCHARGE
Start: 2024-09-02 | End: 2024-09-09

## 2024-09-02 RX ORDER — SULFAMETHOXAZOLE/TRIMETHOPRIM 800-160 MG
1 TABLET ORAL DAILY
DISCHARGE
Start: 2024-09-02 | End: 2024-09-05

## 2024-09-02 RX ADMIN — SULFAMETHOXAZOLE AND TRIMETHOPRIM 1 TABLET: 800; 160 TABLET ORAL at 15:00

## 2024-09-02 RX ADMIN — DOXYCYCLINE HYCLATE 100 MG: 100 CAPSULE ORAL at 09:48

## 2024-09-02 RX ADMIN — MICONAZOLE NITRATE: 2 OINTMENT TOPICAL at 09:49

## 2024-09-02 RX ADMIN — TRIAMCINOLONE ACETONIDE: 1 OINTMENT TOPICAL at 09:49

## 2024-09-02 RX ADMIN — ENOXAPARIN SODIUM 40 MG: 100 INJECTION SUBCUTANEOUS at 09:48

## 2024-09-02 RX ADMIN — LOSARTAN POTASSIUM 25 MG: 50 TABLET, FILM COATED ORAL at 09:48

## 2024-09-02 NOTE — DISCHARGE SUMMARY
Internal Medicine Progress Note     [facility]     Cesar Mcdowell D.O., LILIANA.                         Caleb Asher D.O., BHARAT Joya D.O.     Tiki Garcias, MSN, APRN, NP-C  Fortunato Rondon, MSN, APRN-CNP  Jairo Aaron, MSN, APRN, NP-C  Lauren Pearson, MSN, APRN-CNP  Niharika Arenas, MSN, APRN, NP-C       Internal Medicine  Discharge Summary    NAME: Aracelis Murrieta  :  1945  MRN:  [id number]  PCP:Michael Brock DO  ADMITTED: 2024      DISCHARGED: 24    ADMITTING PHYSICIAN: Cesar Mcdowell DO    CONSULTANT(S):   IP CONSULT TO SOCIAL WORK  IP CONSULT TO INFECTIOUS DISEASES     ADMITTING DIAGNOSIS:   Hyponatremia [E87.1]  FTT (failure to thrive) in adult [R62.7]  Pressure ulcers of skin of multiple topographic sites [L89.90]     DISCHARGE DIAGNOSES:   Acute multifactorial hyponatremia, resolved and with discontinuation of hydrochlorothiazide  Depression secondary to 's suicide with continued grieving.  Wound of the buttock and sacral area.    Mild cognitive impairment with depression.    Malnutrition, both protein and caloric.   Urinary tract infection with pansensitive E. coli.    BRIEF HISTORY OF PRESENT ILLNESS:   This is a pleasant 79-year-old white female who was admitted to [facility]. The patient was admitted to the hospital here through the emergency room on 2024. The patient was admitted to the hospital here with increasing amount of discomfort. Apparently, the patient had been somewhat sedentary at home due to inability and associated refusal to walk. The patient presented to hospital with a large wound noted on the posterior spine area and also on the buttock area. The patient has been getting outpatient wound care, but because of progressive decline, was seen and evaluated in the emergency room. It was also noted at this time that her sodium was low at 123. The patient does have evidence of poor nutrition along

## 2024-09-02 NOTE — PROGRESS NOTES
Discussed risks, benefits, and alternatives with patient.    Patient reason for declining Q2 moves treatment: Patient screams, \"I NEED TO SEE THE [employer] TELEVISION, MY EYES HAVE TO BE CENTER WITH THE TELEVISION\".     The following provider was notified of patient's intent to refuse: Dr. Asher    Alternative interventions used in the meantime: Encouragement, small turns as patient allows RN

## 2024-09-02 NOTE — PROGRESS NOTES
Patient - Aracelis Murrieta,  Age - 79 y.o.    - 1945      Room Number - 0539/0539-01   MRN -  [id number]   [facility] # - [id number]  Date of Admission -  2024  7:13 AM    Problem list of patient:     Hospital Problems             Last Modified POA    * (Principal) Hyponatremia 2024 Yes     ASSESSMENT/PLAN       Cellulitis post back improved  Dermatitis post back  improved  Uti  Urine cx E coli sensinoted change to bactrim     Continue wound care  Encourage nutritional support  Continue to off load wound/pressure relief bed  Try triamcinolone/miconazole applied to back  Stop doxycycline    Aso 21 mrsa screen pending   Can d/c  F/u prn       SUBJECTIVE:   DOS:  24 in bed has no c/o [employer]   in bed has no c/o f/c   spitting up no f/c/n/v/d   in bed ate has no c/o f/c    OBJECTIVE   VITALS    oral temperature is 97.7 °F (36.5 °C). Her blood pressure is 158/82 (abnormal) and her pulse is 97. Her respiration is 18 and oxygen saturation is 94%.       General Appearance:   awake and alert   HEENT:    at/nc  Neck:   supple, no mass  Lungs:    cta ant B   Cardiovascular:  s1/s2  Abdomen:   soft nt /nd  :  ellis  Neurologic:   nad no focal deficits   Skin :    no rash  post back less red skin drying satble  Extremities:   has rom           Peripheral Intravenous Line:  Peripheral IV 24 Right Antecubital (Active)   Site Assessment Clean, dry & intact 24   Line Status Infusing 24 08   Line Care Connections checked and tightened 24 08   Phlebitis Assessment No symptoms 24 08   Infiltration Assessment 0 24   Alcohol Cap Used Yes 24   Dressing Status Clean, dry & intact 24   Dressing Type Transparent 24           Drain(s):  Urinary Catheter 24 Ellis (Active)   Catheter Indications Assist in healing of open sacral or perineal wounds (Stage III,   Special Requests Site: Urine     Culture ESCHERICHIA COLI >100,000 CFU/ML    Susceptibility        Escherichia coli      BACTERIAL SUSCEPTIBILITY PANEL YOUSIF      ceFAZolin <=4  Sensitive  [1]       cefepime <=0.12  Sensitive      cefotaxime <=0.25  Sensitive      cefOXitin <=4  Sensitive      cefTAZidime <=1  Sensitive      ciprofloxacin <=0.06  Sensitive      levofloxacin <=0.12  Sensitive      meropenem <=0.25  Sensitive      nitrofurantoin <=16  Sensitive      piperacillin-tazobactam <=4  Sensitive      trimethoprim-sulfamethoxazole <=20  Sensitive                   [1]  Cefazolin sensitivity results can be used to predict the effectiveness of oral   cephalosporins (eg. Cephalexin) in uncomplicated Urinary Tract Infections due to E. coli, K.   pneumoniae, and P. mirabilis                              IMAGING:     No results found.  No results found.      Please call [phone]  for any questions or concerns.    Electronically signed by Terra May MD on 9/2/2024 at 12:36 PM

## 2024-09-03 LAB
MICROORGANISM SPEC CULT: ABNORMAL
SERVICE CMNT-IMP: ABNORMAL
SPECIMEN DESCRIPTION: ABNORMAL

## 2024-09-04 ENCOUNTER — OUTSIDE SERVICES (OUTPATIENT)
Dept: PRIMARY CARE CLINIC | Age: 79
End: 2024-09-04

## 2024-09-04 DIAGNOSIS — E87.1 HYPONATREMIA: ICD-10-CM

## 2024-09-04 DIAGNOSIS — L89.90 PRESSURE INJURY OF SKIN, UNSPECIFIED INJURY STAGE, UNSPECIFIED LOCATION: ICD-10-CM

## 2024-09-04 DIAGNOSIS — R53.1 WEAKNESS: ICD-10-CM

## 2024-09-04 DIAGNOSIS — R62.7 FAILURE TO THRIVE IN ADULT: ICD-10-CM

## 2024-09-04 DIAGNOSIS — N39.0 E-COLI UTI: Primary | ICD-10-CM

## 2024-09-04 DIAGNOSIS — J43.2 CENTRILOBULAR EMPHYSEMA (HCC): ICD-10-CM

## 2024-09-04 DIAGNOSIS — B96.20 E-COLI UTI: Primary | ICD-10-CM

## 2024-09-04 DIAGNOSIS — I25.10 CORONARY ARTERY DISEASE INVOLVING NATIVE CORONARY ARTERY OF NATIVE HEART WITHOUT ANGINA PECTORIS: ICD-10-CM

## 2024-09-05 ASSESSMENT — ENCOUNTER SYMPTOMS
EYE ITCHING: 0
TROUBLE SWALLOWING: 0
WHEEZING: 0
SINUS PRESSURE: 0
SHORTNESS OF BREATH: 1
SORE THROAT: 0
EYE REDNESS: 0
SINUS PAIN: 0
RHINORRHEA: 0
VOICE CHANGE: 0
GASTROINTESTINAL NEGATIVE: 1
COUGH: 0
EYE DISCHARGE: 0

## 2024-09-05 ASSESSMENT — VISUAL ACUITY: OU: 1

## 2024-09-05 NOTE — PROGRESS NOTES
Visit Date: 9/4/24  Aracelis Murrieta  1945  female 79 y.o.      Subjective:    CC: Patient presents with e-coli and weakness. Patient presents for follow up of hyponatremia, cad, emphysema and failure to thrive .    HPI:  Urinary Tract Infection  This is a new problem. The current episode started in the past 7 days. The problem has been gradually improving since onset. Pertinent negatives include no hematuria or pain.   Extremity Weakness  This is a new problem. The current episode started in the past 7 days. The problem occurs constantly. The problem has been unchanged. Pertinent negatives include no congestion, coughing, myalgias or sore throat. Nothing aggravates the symptoms. Treatments tried: pt/ot eval and treat.   Coronary Artery Disease  Presents for follow-up visit. Symptoms include shortness of breath. The symptoms have been stable. Compliance with diet is good. Compliance with exercise is good. Compliance with medications is good.   Shortness of Breath  This is a chronic problem. The problem occurs constantly. The problem has been unchanged. Pertinent negatives include no ear pain, rhinorrhea, sore throat or wheezing. Nothing aggravates the symptoms. Treatments tried: taking medications, no medication side effects. The treatment provided mild relief. Her past medical history is significant for CAD.       ROS:  Review of Systems   Constitutional: Negative.    HENT:  Negative for congestion, ear discharge, ear pain, mouth sores, nosebleeds, postnasal drip, rhinorrhea, sinus pressure, sinus pain, sneezing, sore throat, trouble swallowing and voice change.    Eyes:  Negative for discharge, redness, itching and visual disturbance.        Denies diplopia, recent change in visual acuity, blurred vision, and night vision loss. Does not wear corrective lenses.   Respiratory:  Positive for shortness of breath. Negative for cough and wheezing.         Denies asthma, COPD, hemoptysis   Cardiovascular: Negative.

## 2024-09-09 ENCOUNTER — OUTSIDE SERVICES (OUTPATIENT)
Dept: PRIMARY CARE CLINIC | Age: 79
End: 2024-09-09

## 2024-09-09 DIAGNOSIS — I10 PRIMARY HYPERTENSION: ICD-10-CM

## 2024-09-09 DIAGNOSIS — R53.1 WEAKNESS: Primary | ICD-10-CM

## 2024-09-09 DIAGNOSIS — R62.7 FAILURE TO THRIVE IN ADULT: ICD-10-CM

## 2024-09-09 DIAGNOSIS — J43.2 CENTRILOBULAR EMPHYSEMA (HCC): ICD-10-CM

## 2024-09-09 DIAGNOSIS — L89.90 PRESSURE INJURY OF SKIN, UNSPECIFIED INJURY STAGE, UNSPECIFIED LOCATION: ICD-10-CM

## 2024-09-09 DIAGNOSIS — I25.10 CORONARY ARTERY DISEASE INVOLVING NATIVE CORONARY ARTERY OF NATIVE HEART WITHOUT ANGINA PECTORIS: ICD-10-CM

## 2024-09-16 ENCOUNTER — OUTSIDE SERVICES (OUTPATIENT)
Dept: PRIMARY CARE CLINIC | Age: 79
End: 2024-09-16

## 2024-09-16 DIAGNOSIS — L89.90 PRESSURE INJURY OF SKIN, UNSPECIFIED INJURY STAGE, UNSPECIFIED LOCATION: ICD-10-CM

## 2024-09-16 DIAGNOSIS — R62.7 FAILURE TO THRIVE IN ADULT: ICD-10-CM

## 2024-09-16 DIAGNOSIS — I10 PRIMARY HYPERTENSION: ICD-10-CM

## 2024-09-16 DIAGNOSIS — I25.10 CORONARY ARTERY DISEASE INVOLVING NATIVE CORONARY ARTERY OF NATIVE HEART WITHOUT ANGINA PECTORIS: ICD-10-CM

## 2024-09-16 DIAGNOSIS — J43.2 CENTRILOBULAR EMPHYSEMA (HCC): ICD-10-CM

## 2024-09-16 DIAGNOSIS — E87.1 HYPONATREMIA: ICD-10-CM

## 2024-09-16 DIAGNOSIS — R53.1 WEAKNESS: Primary | ICD-10-CM

## 2024-09-18 ASSESSMENT — ENCOUNTER SYMPTOMS
SINUS PAIN: 0
RHINORRHEA: 0
EYE REDNESS: 0
VOICE CHANGE: 0
EYE DISCHARGE: 0
TROUBLE SWALLOWING: 0
SORE THROAT: 0
GASTROINTESTINAL NEGATIVE: 1
SINUS PRESSURE: 0
EYE ITCHING: 0
WHEEZING: 0
COUGH: 0
SHORTNESS OF BREATH: 1

## 2024-09-18 ASSESSMENT — VISUAL ACUITY: OU: 1

## 2025-07-22 ENCOUNTER — OUTSIDE SERVICES (OUTPATIENT)
Dept: PRIMARY CARE CLINIC | Age: 80
End: 2025-07-22
Payer: MEDICARE

## 2025-07-22 DIAGNOSIS — I49.9 CARDIAC ARRHYTHMIA, UNSPECIFIED CARDIAC ARRHYTHMIA TYPE: ICD-10-CM

## 2025-07-22 DIAGNOSIS — R62.7 FAILURE TO THRIVE IN ADULT: Primary | ICD-10-CM

## 2025-07-22 DIAGNOSIS — E55.9 VITAMIN D DEFICIENCY, UNSPECIFIED: ICD-10-CM

## 2025-07-22 DIAGNOSIS — R53.1 WEAKNESS: ICD-10-CM

## 2025-07-22 DIAGNOSIS — E43 UNSPECIFIED SEVERE PROTEIN-CALORIE MALNUTRITION: ICD-10-CM

## 2025-07-22 DIAGNOSIS — G47.00 INSOMNIA, UNSPECIFIED TYPE: ICD-10-CM

## 2025-07-22 DIAGNOSIS — F02.B4 MODERATE ALZHEIMER'S DEMENTIA WITH ANXIETY, UNSPECIFIED TIMING OF DEMENTIA ONSET (HCC): ICD-10-CM

## 2025-07-22 DIAGNOSIS — F33.9 RECURRENT MAJOR DEPRESSIVE DISORDER, REMISSION STATUS UNSPECIFIED: ICD-10-CM

## 2025-07-22 DIAGNOSIS — G30.9 MODERATE ALZHEIMER'S DEMENTIA WITH ANXIETY, UNSPECIFIED TIMING OF DEMENTIA ONSET (HCC): ICD-10-CM

## 2025-07-22 DIAGNOSIS — Z51.5 HOSPICE CARE PATIENT: ICD-10-CM

## 2025-07-22 DIAGNOSIS — I10 PRIMARY HYPERTENSION: ICD-10-CM

## 2025-07-22 PROCEDURE — 99497 ADVNCD CARE PLAN 30 MIN: CPT | Performed by: INTERNAL MEDICINE

## 2025-07-22 PROCEDURE — 99306 1ST NF CARE HIGH MDM 50: CPT | Performed by: INTERNAL MEDICINE

## 2025-07-28 ASSESSMENT — ENCOUNTER SYMPTOMS: SHORTNESS OF BREATH: 1

## 2025-07-28 NOTE — PROGRESS NOTES
preparation for the clinic visit, reviewing records/tests, counseling/education of the patient, ordering medications/tests/procedures, coordinating care, and documenting clinical information in the EHR.      ISoledad LPN am scribing for Dr. JAMES Bone.    FREEMAN Cagle Sohair K Rostom, MD, personally performed the services described in this documentation as scribed by Soledad Delcid and it is both accurate and complete